# Patient Record
Sex: FEMALE | Race: WHITE | NOT HISPANIC OR LATINO
[De-identification: names, ages, dates, MRNs, and addresses within clinical notes are randomized per-mention and may not be internally consistent; named-entity substitution may affect disease eponyms.]

---

## 2017-02-07 PROBLEM — Z00.00 ENCOUNTER FOR PREVENTIVE HEALTH EXAMINATION: Status: ACTIVE | Noted: 2017-02-07

## 2017-03-06 ENCOUNTER — APPOINTMENT (OUTPATIENT)
Dept: OBGYN | Facility: CLINIC | Age: 27
End: 2017-03-06

## 2017-03-06 VITALS
HEIGHT: 62 IN | WEIGHT: 108 LBS | BODY MASS INDEX: 19.88 KG/M2 | DIASTOLIC BLOOD PRESSURE: 60 MMHG | SYSTOLIC BLOOD PRESSURE: 100 MMHG

## 2017-03-06 DIAGNOSIS — M71.22 SYNOVIAL CYST OF POPLITEAL SPACE [BAKER], LEFT KNEE: ICD-10-CM

## 2017-03-06 DIAGNOSIS — D16.9 BENIGN NEOPLASM OF BONE AND ARTICULAR CARTILAGE, UNSPECIFIED: ICD-10-CM

## 2017-03-06 DIAGNOSIS — Z84.2 FAMILY HISTORY OF OTHER DISEASES OF THE GENITOURINARY SYSTEM: ICD-10-CM

## 2017-03-06 RX ORDER — SERTRALINE HYDROCHLORIDE 25 MG/1
TABLET, FILM COATED ORAL
Refills: 0 | Status: ACTIVE | COMMUNITY

## 2017-03-27 LAB
ESTRADIOL FREE SERPL-MCNC: 49 PG/ML
FSH SERPL-MCNC: 5.3 IU/L
PROGEST SERPL-MCNC: 0.3 NG/ML
PROLACTIN SERPL-MCNC: 8.6 NG/ML
TSH SERPL DL<=0.005 MIU/L-ACNC: 1.37 UIU/ML

## 2017-03-29 LAB
TESTOSTERONE SERUM FREE (NORTH): 0.9 PG/ML
TESTOSTERONE SERUM TOTAL (NORTH): 20 NG/DL

## 2017-03-30 LAB
17OHP SERPL-MCNC: 31 NG/DL
DHEA SERPL-MCNC: 238 NG/DL

## 2017-04-12 ENCOUNTER — RESULT REVIEW (OUTPATIENT)
Age: 27
End: 2017-04-12

## 2017-04-13 LAB
ESTRADIOL FREE SERPL-MCNC: 284 PG/ML
FSH SERPL-MCNC: 2 IU/L
PROGEST SERPL-MCNC: 24.1 NG/ML
PROLACTIN SERPL-MCNC: 9.3 NG/ML
TSH SERPL DL<=0.005 MIU/L-ACNC: 1.43 UIU/ML

## 2017-04-17 LAB
TESTOSTERONE SERUM FREE (NORTH): 1.6 PG/ML
TESTOSTERONE SERUM TOTAL (NORTH): 24 NG/DL

## 2017-04-19 LAB
17OHP SERPL-MCNC: 206 NG/DL
DHEA SERPL-MCNC: 207 NG/DL

## 2017-04-20 ENCOUNTER — OUTPATIENT (OUTPATIENT)
Dept: OUTPATIENT SERVICES | Facility: HOSPITAL | Age: 27
LOS: 1 days | Discharge: HOME | End: 2017-04-20

## 2017-04-20 ENCOUNTER — APPOINTMENT (OUTPATIENT)
Dept: OBGYN | Facility: CLINIC | Age: 27
End: 2017-04-20

## 2017-04-20 VITALS
SYSTOLIC BLOOD PRESSURE: 100 MMHG | DIASTOLIC BLOOD PRESSURE: 60 MMHG | HEIGHT: 62 IN | BODY MASS INDEX: 19.88 KG/M2 | WEIGHT: 108 LBS

## 2017-06-27 DIAGNOSIS — Z78.9 OTHER SPECIFIED HEALTH STATUS: ICD-10-CM

## 2017-07-14 ENCOUNTER — OUTPATIENT (OUTPATIENT)
Dept: OUTPATIENT SERVICES | Facility: HOSPITAL | Age: 27
LOS: 1 days | Discharge: HOME | End: 2017-07-14

## 2017-07-14 DIAGNOSIS — N97.9 FEMALE INFERTILITY, UNSPECIFIED: ICD-10-CM

## 2017-07-17 ENCOUNTER — TRANSCRIPTION ENCOUNTER (OUTPATIENT)
Age: 27
End: 2017-07-17

## 2018-10-09 ENCOUNTER — OUTPATIENT (OUTPATIENT)
Dept: OUTPATIENT SERVICES | Facility: HOSPITAL | Age: 28
LOS: 1 days | Discharge: HOME | End: 2018-10-09

## 2018-10-09 ENCOUNTER — LABORATORY RESULT (OUTPATIENT)
Age: 28
End: 2018-10-09

## 2018-10-09 ENCOUNTER — APPOINTMENT (OUTPATIENT)
Dept: OBGYN | Facility: CLINIC | Age: 28
End: 2018-10-09
Payer: COMMERCIAL

## 2018-10-09 VITALS — BODY MASS INDEX: 21.16 KG/M2 | WEIGHT: 115 LBS | HEIGHT: 62 IN

## 2018-10-09 DIAGNOSIS — O21.9 VOMITING OF PREGNANCY, UNSPECIFIED: ICD-10-CM

## 2018-10-09 LAB
BILIRUB UR QL STRIP: NORMAL
CLARITY UR: CLEAR
GLUCOSE UR-MCNC: NORMAL
HCG UR QL: NORMAL EU/DL
HGB UR QL STRIP.AUTO: 250
KETONES UR-MCNC: NORMAL
LEUKOCYTE ESTERASE UR QL STRIP: 500
NITRITE UR QL STRIP: NORMAL
PH UR STRIP: 5
PROT UR STRIP-MCNC: NORMAL
SP GR UR STRIP: 1.02

## 2018-10-09 PROCEDURE — 99213 OFFICE O/P EST LOW 20 MIN: CPT | Mod: 25

## 2018-10-09 PROCEDURE — 76830 TRANSVAGINAL US NON-OB: CPT

## 2018-10-11 DIAGNOSIS — N91.2 AMENORRHEA, UNSPECIFIED: ICD-10-CM

## 2018-10-16 ENCOUNTER — LABORATORY RESULT (OUTPATIENT)
Age: 28
End: 2018-10-16

## 2018-10-16 ENCOUNTER — OUTPATIENT (OUTPATIENT)
Dept: OUTPATIENT SERVICES | Facility: HOSPITAL | Age: 28
LOS: 1 days | Discharge: HOME | End: 2018-10-16

## 2018-10-16 DIAGNOSIS — Z34.00 ENCOUNTER FOR SUPERVISION OF NORMAL FIRST PREGNANCY, UNSPECIFIED TRIMESTER: ICD-10-CM

## 2018-10-30 ENCOUNTER — APPOINTMENT (OUTPATIENT)
Dept: OBGYN | Facility: CLINIC | Age: 28
End: 2018-10-30
Payer: COMMERCIAL

## 2018-10-30 ENCOUNTER — NON-APPOINTMENT (OUTPATIENT)
Age: 28
End: 2018-10-30

## 2018-10-30 VITALS — BODY MASS INDEX: 22.08 KG/M2 | HEIGHT: 62 IN | WEIGHT: 120 LBS

## 2018-10-30 PROCEDURE — 0502F SUBSEQUENT PRENATAL CARE: CPT

## 2018-11-15 ENCOUNTER — NON-APPOINTMENT (OUTPATIENT)
Age: 28
End: 2018-11-15

## 2018-11-21 ENCOUNTER — APPOINTMENT (OUTPATIENT)
Dept: OBGYN | Facility: CLINIC | Age: 28
End: 2018-11-21
Payer: COMMERCIAL

## 2018-11-21 ENCOUNTER — NON-APPOINTMENT (OUTPATIENT)
Age: 28
End: 2018-11-21

## 2018-11-21 VITALS — DIASTOLIC BLOOD PRESSURE: 60 MMHG | BODY MASS INDEX: 22.68 KG/M2 | WEIGHT: 124 LBS | SYSTOLIC BLOOD PRESSURE: 100 MMHG

## 2018-11-21 LAB
GLUCOSE UR-MCNC: NORMAL
HGB UR QL STRIP.AUTO: NORMAL
KETONES UR-MCNC: NORMAL
LEUKOCYTE ESTERASE UR QL STRIP: NORMAL
NITRITE UR QL STRIP: NORMAL
PROT UR STRIP-MCNC: NORMAL

## 2018-11-21 PROCEDURE — 0502F SUBSEQUENT PRENATAL CARE: CPT

## 2018-11-21 PROCEDURE — 90682 RIV4 VACC RECOMBINANT DNA IM: CPT

## 2018-11-21 PROCEDURE — 90471 IMMUNIZATION ADMIN: CPT

## 2018-12-10 ENCOUNTER — NON-APPOINTMENT (OUTPATIENT)
Age: 28
End: 2018-12-10

## 2018-12-11 ENCOUNTER — APPOINTMENT (OUTPATIENT)
Dept: OBGYN | Facility: CLINIC | Age: 28
End: 2018-12-11
Payer: COMMERCIAL

## 2018-12-11 ENCOUNTER — NON-APPOINTMENT (OUTPATIENT)
Age: 28
End: 2018-12-11

## 2018-12-11 VITALS — BODY MASS INDEX: 23.37 KG/M2 | HEIGHT: 62 IN | WEIGHT: 127 LBS

## 2018-12-11 PROCEDURE — 0501F PRENATAL FLOW SHEET: CPT

## 2018-12-12 ENCOUNTER — TRANSCRIPTION ENCOUNTER (OUTPATIENT)
Age: 28
End: 2018-12-12

## 2018-12-12 LAB
BILIRUB UR QL STRIP: NORMAL
CLARITY UR: CLEAR
GLUCOSE UR-MCNC: NORMAL
HCG UR QL: NORMAL EU/DL
HGB UR QL STRIP.AUTO: NORMAL
KETONES UR-MCNC: NORMAL
LEUKOCYTE ESTERASE UR QL STRIP: 500
NITRITE UR QL STRIP: NORMAL
PH UR STRIP: 5
PROT UR STRIP-MCNC: NORMAL
SP GR UR STRIP: 1.02

## 2018-12-27 ENCOUNTER — APPOINTMENT (OUTPATIENT)
Dept: OBGYN | Facility: CLINIC | Age: 28
End: 2018-12-27
Payer: COMMERCIAL

## 2018-12-27 PROCEDURE — 76805 OB US >/= 14 WKS SNGL FETUS: CPT

## 2018-12-28 ENCOUNTER — NON-APPOINTMENT (OUTPATIENT)
Age: 28
End: 2018-12-28

## 2019-01-02 ENCOUNTER — NON-APPOINTMENT (OUTPATIENT)
Age: 29
End: 2019-01-02

## 2019-01-02 ENCOUNTER — APPOINTMENT (OUTPATIENT)
Dept: OBGYN | Facility: CLINIC | Age: 29
End: 2019-01-02
Payer: COMMERCIAL

## 2019-01-02 ENCOUNTER — CLINICAL ADVICE (OUTPATIENT)
Age: 29
End: 2019-01-02

## 2019-01-02 VITALS — BODY MASS INDEX: 23.96 KG/M2 | DIASTOLIC BLOOD PRESSURE: 70 MMHG | SYSTOLIC BLOOD PRESSURE: 100 MMHG | WEIGHT: 131 LBS

## 2019-01-02 PROCEDURE — 0502F SUBSEQUENT PRENATAL CARE: CPT

## 2019-01-22 ENCOUNTER — APPOINTMENT (OUTPATIENT)
Dept: OBGYN | Facility: CLINIC | Age: 29
End: 2019-01-22
Payer: COMMERCIAL

## 2019-01-22 ENCOUNTER — NON-APPOINTMENT (OUTPATIENT)
Age: 29
End: 2019-01-22

## 2019-01-22 VITALS
HEIGHT: 62 IN | DIASTOLIC BLOOD PRESSURE: 60 MMHG | SYSTOLIC BLOOD PRESSURE: 100 MMHG | WEIGHT: 135 LBS | BODY MASS INDEX: 24.84 KG/M2

## 2019-01-22 LAB
BILIRUB UR QL STRIP: NORMAL
CLARITY UR: CLEAR
GLUCOSE UR-MCNC: NORMAL
HCG UR QL: NORMAL EU/DL
HGB UR QL STRIP.AUTO: NORMAL
KETONES UR-MCNC: NORMAL
LEUKOCYTE ESTERASE UR QL STRIP: 500
NITRITE UR QL STRIP: NORMAL
PH UR STRIP: 6
PROT UR STRIP-MCNC: NORMAL
SP GR UR STRIP: 1.01

## 2019-01-22 PROCEDURE — 0502F SUBSEQUENT PRENATAL CARE: CPT

## 2019-01-26 ENCOUNTER — OUTPATIENT (OUTPATIENT)
Dept: OUTPATIENT SERVICES | Facility: HOSPITAL | Age: 29
LOS: 1 days | Discharge: HOME | End: 2019-01-26

## 2019-01-26 ENCOUNTER — LABORATORY RESULT (OUTPATIENT)
Age: 29
End: 2019-01-26

## 2019-01-26 DIAGNOSIS — Z34.90 ENCOUNTER FOR SUPERVISION OF NORMAL PREGNANCY, UNSPECIFIED, UNSPECIFIED TRIMESTER: ICD-10-CM

## 2019-01-28 LAB
BASOPHILS # BLD AUTO: 0.02 K/UL
BASOPHILS NFR BLD AUTO: 0.3 %
EOSINOPHIL # BLD AUTO: 0.07 K/UL
EOSINOPHIL NFR BLD AUTO: 0.9 %
HCT VFR BLD CALC: 34.8 %
HGB BLD-MCNC: 11 G/DL
IMM GRANULOCYTES NFR BLD AUTO: 0.5 %
LYMPHOCYTES # BLD AUTO: 1.08 K/UL
LYMPHOCYTES NFR BLD AUTO: 13.5 %
MAN DIFF?: NORMAL
MCHC RBC-ENTMCNC: 28.5 PG
MCHC RBC-ENTMCNC: 31.6 G/DL
MCV RBC AUTO: 90.2 FL
MONOCYTES # BLD AUTO: 0.42 K/UL
MONOCYTES NFR BLD AUTO: 5.3 %
NEUTROPHILS # BLD AUTO: 6.36 K/UL
NEUTROPHILS NFR BLD AUTO: 79.5 %
PLATELET # BLD AUTO: 228 K/UL
RBC # BLD: 3.86 M/UL
RBC # FLD: 13 %
WBC # FLD AUTO: 7.99 K/UL

## 2019-01-31 ENCOUNTER — NON-APPOINTMENT (OUTPATIENT)
Age: 29
End: 2019-01-31

## 2019-01-31 ENCOUNTER — OUTPATIENT (OUTPATIENT)
Dept: OUTPATIENT SERVICES | Facility: HOSPITAL | Age: 29
LOS: 1 days | Discharge: HOME | End: 2019-01-31

## 2019-01-31 ENCOUNTER — APPOINTMENT (OUTPATIENT)
Dept: OBGYN | Facility: CLINIC | Age: 29
End: 2019-01-31
Payer: COMMERCIAL

## 2019-01-31 VITALS — WEIGHT: 137 LBS | BODY MASS INDEX: 25.06 KG/M2 | DIASTOLIC BLOOD PRESSURE: 60 MMHG | SYSTOLIC BLOOD PRESSURE: 100 MMHG

## 2019-01-31 DIAGNOSIS — N89.8 OTHER SPECIFIED NONINFLAMMATORY DISORDERS OF VAGINA: ICD-10-CM

## 2019-01-31 PROCEDURE — 87075 CULTR BACTERIA EXCEPT BLOOD: CPT

## 2019-01-31 PROCEDURE — 0502F SUBSEQUENT PRENATAL CARE: CPT

## 2019-02-11 ENCOUNTER — RESULT REVIEW (OUTPATIENT)
Age: 29
End: 2019-02-11

## 2019-02-11 LAB
A VAGINAE DNA VAG QL NAA+PROBE: ABNORMAL
BVAB2 DNA VAG QL NAA+PROBE: NORMAL
C KRUSEI DNA VAG QL NAA+PROBE: NEGATIVE
C KRUSEI DNA VAG QL NAA+PROBE: POSITIVE
C TRACH RRNA SPEC QL NAA+PROBE: NEGATIVE
MEGA1 DNA VAG QL NAA+PROBE: NORMAL
N GONORRHOEA RRNA SPEC QL NAA+PROBE: NEGATIVE
T VAGINALIS RRNA SPEC QL NAA+PROBE: NEGATIVE

## 2019-02-12 ENCOUNTER — NON-APPOINTMENT (OUTPATIENT)
Age: 29
End: 2019-02-12

## 2019-02-12 ENCOUNTER — APPOINTMENT (OUTPATIENT)
Dept: OBGYN | Facility: CLINIC | Age: 29
End: 2019-02-12
Payer: COMMERCIAL

## 2019-02-12 VITALS — BODY MASS INDEX: 25.76 KG/M2 | WEIGHT: 140 LBS | HEIGHT: 62 IN

## 2019-02-12 LAB
BILIRUB UR QL STRIP: NORMAL
CLARITY UR: CLEAR
GLUCOSE UR-MCNC: NORMAL
HCG UR QL: NORMAL EU/DL
HGB UR QL STRIP.AUTO: NORMAL
KETONES UR-MCNC: NORMAL
LEUKOCYTE ESTERASE UR QL STRIP: 250
NITRITE UR QL STRIP: NORMAL
PH UR STRIP: 8
PROT UR STRIP-MCNC: NORMAL
SP GR UR STRIP: 1.01

## 2019-02-12 PROCEDURE — 0502F SUBSEQUENT PRENATAL CARE: CPT

## 2019-02-13 ENCOUNTER — CLINICAL ADVICE (OUTPATIENT)
Age: 29
End: 2019-02-13

## 2019-02-27 ENCOUNTER — OUTPATIENT (OUTPATIENT)
Dept: OUTPATIENT SERVICES | Facility: HOSPITAL | Age: 29
LOS: 1 days | Discharge: HOME | End: 2019-02-27

## 2019-02-27 DIAGNOSIS — Z34.90 ENCOUNTER FOR SUPERVISION OF NORMAL PREGNANCY, UNSPECIFIED, UNSPECIFIED TRIMESTER: ICD-10-CM

## 2019-03-02 ENCOUNTER — OUTPATIENT (OUTPATIENT)
Dept: OUTPATIENT SERVICES | Facility: HOSPITAL | Age: 29
LOS: 1 days | Discharge: HOME | End: 2019-03-02

## 2019-03-02 VITALS — SYSTOLIC BLOOD PRESSURE: 112 MMHG | HEART RATE: 85 BPM | DIASTOLIC BLOOD PRESSURE: 66 MMHG

## 2019-03-02 VITALS
DIASTOLIC BLOOD PRESSURE: 65 MMHG | HEART RATE: 84 BPM | RESPIRATION RATE: 17 BRPM | SYSTOLIC BLOOD PRESSURE: 101 MMHG | TEMPERATURE: 99 F

## 2019-03-02 DIAGNOSIS — Z98.890 OTHER SPECIFIED POSTPROCEDURAL STATES: Chronic | ICD-10-CM

## 2019-03-02 LAB
APTT BLD: 25.7 SEC — LOW (ref 27–39.2)
APTT BLD: 26.8 SEC — LOW (ref 27–39.2)
BLD GP AB SCN SERPL QL: SIGNIFICANT CHANGE UP
FIBRINOGEN PPP-MCNC: 524 MG/DL — SIGNIFICANT CHANGE UP (ref 204.4–570.6)
HCT VFR BLD CALC: 30.4 % — LOW (ref 37–47)
HCT VFR BLD CALC: 31.5 % — LOW (ref 37–47)
HGB BLD-MCNC: 10.2 G/DL — LOW (ref 12–16)
HGB BLD-MCNC: 9.8 G/DL — LOW (ref 12–16)
INR BLD: 1.04 RATIO — SIGNIFICANT CHANGE UP (ref 0.65–1.3)
INR BLD: 1.04 RATIO — SIGNIFICANT CHANGE UP (ref 0.65–1.3)
MCHC RBC-ENTMCNC: 27.4 PG — SIGNIFICANT CHANGE UP (ref 27–31)
MCHC RBC-ENTMCNC: 27.6 PG — SIGNIFICANT CHANGE UP (ref 27–31)
MCHC RBC-ENTMCNC: 32.2 G/DL — SIGNIFICANT CHANGE UP (ref 32–37)
MCHC RBC-ENTMCNC: 32.4 G/DL — SIGNIFICANT CHANGE UP (ref 32–37)
MCV RBC AUTO: 84.9 FL — SIGNIFICANT CHANGE UP (ref 81–99)
MCV RBC AUTO: 85.1 FL — SIGNIFICANT CHANGE UP (ref 81–99)
NRBC # BLD: 0 /100 WBCS — SIGNIFICANT CHANGE UP (ref 0–0)
NRBC # BLD: 0 /100 WBCS — SIGNIFICANT CHANGE UP (ref 0–0)
PLATELET # BLD AUTO: 232 K/UL — SIGNIFICANT CHANGE UP (ref 130–400)
PLATELET # BLD AUTO: 248 K/UL — SIGNIFICANT CHANGE UP (ref 130–400)
PROTHROM AB SERPL-ACNC: 11.9 SEC — SIGNIFICANT CHANGE UP (ref 9.95–12.87)
PROTHROM AB SERPL-ACNC: 11.9 SEC — SIGNIFICANT CHANGE UP (ref 9.95–12.87)
RBC # BLD: 3.58 M/UL — LOW (ref 4.2–5.4)
RBC # BLD: 3.7 M/UL — LOW (ref 4.2–5.4)
RBC # FLD: 12.9 % — SIGNIFICANT CHANGE UP (ref 11.5–14.5)
RBC # FLD: 13 % — SIGNIFICANT CHANGE UP (ref 11.5–14.5)
TYPE + AB SCN PNL BLD: SIGNIFICANT CHANGE UP
WBC # BLD: 10.62 K/UL — SIGNIFICANT CHANGE UP (ref 4.8–10.8)
WBC # BLD: 9.57 K/UL — SIGNIFICANT CHANGE UP (ref 4.8–10.8)
WBC # FLD AUTO: 10.62 K/UL — SIGNIFICANT CHANGE UP (ref 4.8–10.8)
WBC # FLD AUTO: 9.57 K/UL — SIGNIFICANT CHANGE UP (ref 4.8–10.8)

## 2019-03-02 RX ORDER — SODIUM CHLORIDE 9 MG/ML
1000 INJECTION, SOLUTION INTRAVENOUS
Qty: 0 | Refills: 0 | Status: DISCONTINUED | OUTPATIENT
Start: 2019-03-02 | End: 2019-03-17

## 2019-03-02 NOTE — PROGRESS NOTE ADULT - ASSESSMENT
29 yo  at 29w5d GA dated by day 5 FET with ICSI, h/o anxiety on zoloft, with resolved vaginal bleeding, intermittently amy q2m on toco, for r/o abruption and r/o PTL  -repeat abruption labs now  -c/w IV fluids  -continuous efm and toco  -pain management if needed      DIscussed with Dr. Bowen and Dr. Cates

## 2019-03-02 NOTE — OB PROVIDER TRIAGE NOTE - NSHPPHYSICALEXAM_GEN_ALL_CORE
Vital Signs Last 24 Hrs  T(F): 98.6 (02 Mar 2019 10:57), Max: 98.6 (02 Mar 2019 10:57)  HR: 85 (02 Mar 2019 10:57) (85 - 85)  BP: 112/66 (02 Mar 2019 10:57) (112/66 - 112/66)  RR: 16 (02 Mar 2019 10:57) (16 - 16)    Udip: large leuks, large blood    EFM: cateogry 1  [place]: Q2m    Gen: AAOx3 NAD  Abd: soft, nontender, gravid, no palpable ctx, no cva tenderness, no suprapubic tenderness  Speculum: moderate amount of brown discharge (likely old blood), several drops of bright red blood on cervix, no active bleeding, no visible lesions.

## 2019-03-02 NOTE — OB PROVIDER TRIAGE NOTE - NSHPLABSRESULTS_GEN_ALL_CORE
Sonos: Sonos:  20w5d anatomy scan normal  13w2d SIUP  9w1d SIUP    Labs:  B positive, antibody negative  HIV NR  Hep B sAg NR  Rubella immune  Varicella immune  GCT 96  Pap NILM, HPV HR neg

## 2019-03-02 NOTE — OB PROVIDER TRIAGE NOTE - ADDITIONAL INSTRUCTIONS
Followup with Dr. Cates as scheduled in 3 days.  If you have bleeding, cramping, or leakage of fluid, let your doctor know or come back to labor and delivery.  Please perform nightly kick counts.

## 2019-03-02 NOTE — PROGRESS NOTE ADULT - SUBJECTIVE AND OBJECTIVE BOX
PGY2 Note:    Pt evaluated at bedside, she denies any complaints. She denies cramping, leaking fluid, or vaginal bleeding. She continues to feel regular fetal movement. She tolerated PO without difficulty.    Vital Signs Last 24 Hrs  T(F): 98.06 (02 Mar 2019 19:40), Max: 98.6 (02 Mar 2019 10:57)  HR: 76 (02 Mar 2019 19:40) (74 - 85)  BP: 108/56 (02 Mar 2019 19:40) (105/55 - 112/66)  RR: 18 (02 Mar 2019 19:40) (16 - 18)    EFM: 130/mod/accels  [place]: intermittent contractions, nonpalpable  SVE: Deferred, SVE closed x2 as previously documented    Labs:                        9.8    10.62 )-----------( 248      ( 02 Mar 2019 18:25 )             30.4                         10.2   9.57  )-----------( 232      ( 02 Mar 2019 13:10 )             31.5     Fibrinogen Assay (03.02.19 @ 18:25)    Fibrinogen Assay: 524.0 mg/dL    Activated Partial Thromboplastin Time in AM (03.02.19 @ 18:25)    Activated Partial Thromboplastin Time: 25.7: Heparin Therapeutic Range: 76..97 Sec sec    Prothrombin Time and INR, Plasma in AM (03.02.19 @ 18:25)    Prothrombin Time, Plasma: 11.90 sec    INR: 1.04: NO ANTICOAGULANT,NORMAL            0.65 -  1.30  ORAL ANTICOAGULANT,STD DOSE        2.00 - 3.00  MECHANICAL HEART VALVES            2.50 - 3.50  NOTE: INR RESULTS ARE INTENDED FOR USE ONLY TO  MONITOR  ORAL ANTICOAGULANT THERAPY IN STABILIZED  PATIENTS. ratio

## 2019-03-02 NOTE — OB PROVIDER TRIAGE NOTE - NSOBPROVIDERNOTE_OBGYN_ALL_OB_FT
27 yo  at 29w5d GA dated by day 5 FET with ICSI, with vaginal bleeding and contractions on toco, for r/o abruption and r/o PTL  -tranfer to recovery room  -IV fluids  -abruption labs  -continuous EFM and toco    Discussed with Dr. Bowen and Dr. Cates 27 yo  at 29w5d GA dated by day 5 FET with ICSI, h/o anxiety on zoloft, with vaginal bleeding and contractions on toco, for r/o abruption and r/o PTL  -tranfer to recovery room  -IV fluids  -abruption labs  -continuous EFM and toco    Discussed with Dr. Bowen and Dr. Cates

## 2019-03-02 NOTE — PROGRESS NOTE ADULT - SUBJECTIVE AND OBJECTIVE BOX
PGY2 Note:    Pt evaluated at bedside, she has been amy q2m intermittently but does not feel and pain. She denies leakage of fluid or any new bleeding. She still feels regular fetal movement.    Vital Signs Last 24 Hrs  T(F): 98.6 (02 Mar 2019 10:57), Max: 98.6 (02 Mar 2019 10:57)  HR: 74 (02 Mar 2019 13:57) (74 - 85)  BP: 105/55 (02 Mar 2019 13:57) (105/55 - 112/66)  RR: 16 (02 Mar 2019 10:57) (16 - 16)    EFM: 150/mod/accels, occasional variable deceleration  [place]: q2m  SVE: re-examined, closed cervix at 1616    Labs:                        10.2   9.57  )-----------( 232      ( 02 Mar 2019 13:10 )             31.5     PT/INR - ( 02 Mar 2019 13:10 )   PT: 11.90 sec;   INR: 1.04 ratio         PTT - ( 02 Mar 2019 13:10 )  PTT:26.8 sec    Meds:  IV fluids

## 2019-03-02 NOTE — OB PROVIDER TRIAGE NOTE - HISTORY OF PRESENT ILLNESS
27 yo  at 29w5d GA by day 5 FET with ICSI, first trimester sono presents for vaginal bleeding for 3 days. She had some spotting 3 days ago, followed by brown discharge for 2 days, today she had light vaginal bleeding when she woke up, staining 1 pad today with bright red blood. She denies cramping or leaking fluid. She feels regular fetal movement. She had a subchorionic hematoma in first trimester, with frequent spotting that eventually resolved.  She denies dizziness, weakness, fevers, chills, shortness of breath, chest pain, nausea, vomiting, dysuria, hematuria, diarrhea, or constipation. Last intercourse several months ago. Last bowel movement yesterday.

## 2019-03-04 LAB
B19V IGG SER QL IA: 0.1 INDEX
B19V IGG+IGM SER-IMP: NEGATIVE
B19V IGG+IGM SER-IMP: NORMAL
B19V IGM FLD-ACNC: 0.2
B19V IGM SER-ACNC: NEGATIVE

## 2019-03-05 ENCOUNTER — NON-APPOINTMENT (OUTPATIENT)
Age: 29
End: 2019-03-05

## 2019-03-05 ENCOUNTER — APPOINTMENT (OUTPATIENT)
Dept: OBGYN | Facility: CLINIC | Age: 29
End: 2019-03-05
Payer: COMMERCIAL

## 2019-03-05 VITALS — DIASTOLIC BLOOD PRESSURE: 60 MMHG | WEIGHT: 148 LBS | SYSTOLIC BLOOD PRESSURE: 100 MMHG | BODY MASS INDEX: 27.07 KG/M2

## 2019-03-05 PROBLEM — F41.9 ANXIETY DISORDER, UNSPECIFIED: Chronic | Status: ACTIVE | Noted: 2019-03-02

## 2019-03-05 PROCEDURE — 0502F SUBSEQUENT PRENATAL CARE: CPT

## 2019-03-19 ENCOUNTER — APPOINTMENT (OUTPATIENT)
Dept: OBGYN | Facility: CLINIC | Age: 29
End: 2019-03-19
Payer: COMMERCIAL

## 2019-03-19 ENCOUNTER — NON-APPOINTMENT (OUTPATIENT)
Age: 29
End: 2019-03-19

## 2019-03-19 VITALS — BODY MASS INDEX: 27.07 KG/M2 | DIASTOLIC BLOOD PRESSURE: 60 MMHG | SYSTOLIC BLOOD PRESSURE: 100 MMHG | WEIGHT: 148 LBS

## 2019-03-19 PROCEDURE — 0502F SUBSEQUENT PRENATAL CARE: CPT

## 2019-03-23 ENCOUNTER — APPOINTMENT (OUTPATIENT)
Dept: OBGYN | Facility: CLINIC | Age: 29
End: 2019-03-23
Payer: COMMERCIAL

## 2019-03-23 PROCEDURE — 76819 FETAL BIOPHYS PROFIL W/O NST: CPT

## 2019-03-23 PROCEDURE — 76705 ECHO EXAM OF ABDOMEN: CPT

## 2019-04-02 ENCOUNTER — APPOINTMENT (OUTPATIENT)
Dept: OBGYN | Facility: CLINIC | Age: 29
End: 2019-04-02
Payer: COMMERCIAL

## 2019-04-02 ENCOUNTER — NON-APPOINTMENT (OUTPATIENT)
Age: 29
End: 2019-04-02

## 2019-04-02 VITALS — BODY MASS INDEX: 27.62 KG/M2 | DIASTOLIC BLOOD PRESSURE: 70 MMHG | WEIGHT: 151 LBS | SYSTOLIC BLOOD PRESSURE: 120 MMHG

## 2019-04-02 PROCEDURE — 0502F SUBSEQUENT PRENATAL CARE: CPT

## 2019-04-08 ENCOUNTER — OTHER (OUTPATIENT)
Age: 29
End: 2019-04-08

## 2019-04-16 ENCOUNTER — OUTPATIENT (OUTPATIENT)
Dept: OUTPATIENT SERVICES | Facility: HOSPITAL | Age: 29
LOS: 1 days | Discharge: HOME | End: 2019-04-16

## 2019-04-16 ENCOUNTER — NON-APPOINTMENT (OUTPATIENT)
Age: 29
End: 2019-04-16

## 2019-04-16 ENCOUNTER — APPOINTMENT (OUTPATIENT)
Dept: OBGYN | Facility: CLINIC | Age: 29
End: 2019-04-16
Payer: COMMERCIAL

## 2019-04-16 VITALS — SYSTOLIC BLOOD PRESSURE: 110 MMHG | BODY MASS INDEX: 28.53 KG/M2 | WEIGHT: 156 LBS | DIASTOLIC BLOOD PRESSURE: 70 MMHG

## 2019-04-16 DIAGNOSIS — Z98.890 OTHER SPECIFIED POSTPROCEDURAL STATES: Chronic | ICD-10-CM

## 2019-04-16 DIAGNOSIS — Z34.90 ENCOUNTER FOR SUPERVISION OF NORMAL PREGNANCY, UNSPECIFIED, UNSPECIFIED TRIMESTER: ICD-10-CM

## 2019-04-16 DIAGNOSIS — N89.8 OTHER SPECIFIED NONINFLAMMATORY DISORDERS OF VAGINA: ICD-10-CM

## 2019-04-16 PROCEDURE — 87075 CULTR BACTERIA EXCEPT BLOOD: CPT

## 2019-04-16 PROCEDURE — 0502F SUBSEQUENT PRENATAL CARE: CPT

## 2019-04-17 LAB
CANDIDA VAG CYTO: DETECTED
G VAGINALIS+PREV SP MTYP VAG QL MICRO: NOT DETECTED
T VAGINALIS VAG QL WET PREP: NOT DETECTED

## 2019-04-21 LAB
GP B STREP DNA SPEC QL NAA+PROBE: NORMAL
GP B STREP DNA SPEC QL NAA+PROBE: NOT DETECTED
SOURCE GBS: NORMAL

## 2019-04-23 ENCOUNTER — NON-APPOINTMENT (OUTPATIENT)
Age: 29
End: 2019-04-23

## 2019-04-23 ENCOUNTER — APPOINTMENT (OUTPATIENT)
Dept: OBGYN | Facility: CLINIC | Age: 29
End: 2019-04-23
Payer: COMMERCIAL

## 2019-04-23 VITALS — BODY MASS INDEX: 28.34 KG/M2 | WEIGHT: 154 LBS | HEIGHT: 62 IN

## 2019-04-23 PROCEDURE — 0502F SUBSEQUENT PRENATAL CARE: CPT

## 2019-04-30 ENCOUNTER — NON-APPOINTMENT (OUTPATIENT)
Age: 29
End: 2019-04-30

## 2019-04-30 ENCOUNTER — APPOINTMENT (OUTPATIENT)
Dept: OBGYN | Facility: CLINIC | Age: 29
End: 2019-04-30
Payer: COMMERCIAL

## 2019-04-30 VITALS — SYSTOLIC BLOOD PRESSURE: 110 MMHG | WEIGHT: 157 LBS | DIASTOLIC BLOOD PRESSURE: 70 MMHG | BODY MASS INDEX: 28.72 KG/M2

## 2019-04-30 PROCEDURE — 0502F SUBSEQUENT PRENATAL CARE: CPT

## 2019-05-04 ENCOUNTER — INPATIENT (INPATIENT)
Facility: HOSPITAL | Age: 29
LOS: 3 days | Discharge: HOME | End: 2019-05-08
Attending: OBSTETRICS & GYNECOLOGY | Admitting: OBSTETRICS & GYNECOLOGY
Payer: COMMERCIAL

## 2019-05-04 VITALS — TEMPERATURE: 98 F | SYSTOLIC BLOOD PRESSURE: 123 MMHG | HEART RATE: 89 BPM | DIASTOLIC BLOOD PRESSURE: 80 MMHG

## 2019-05-04 DIAGNOSIS — Z98.890 OTHER SPECIFIED POSTPROCEDURAL STATES: Chronic | ICD-10-CM

## 2019-05-04 PROCEDURE — 59510 CESAREAN DELIVERY: CPT

## 2019-05-04 RX ORDER — SERTRALINE 25 MG/1
100 TABLET, FILM COATED ORAL DAILY
Qty: 0 | Refills: 0 | Status: DISCONTINUED | OUTPATIENT
Start: 2019-05-05 | End: 2019-05-08

## 2019-05-04 RX ORDER — DINOPROSTONE 10 MG/241MG
10 INSERT VAGINAL ONCE
Qty: 0 | Refills: 0 | Status: COMPLETED | OUTPATIENT
Start: 2019-05-04 | End: 2019-05-04

## 2019-05-04 RX ORDER — SODIUM CHLORIDE 9 MG/ML
1000 INJECTION, SOLUTION INTRAVENOUS
Qty: 0 | Refills: 0 | Status: DISCONTINUED | OUTPATIENT
Start: 2019-05-04 | End: 2019-05-05

## 2019-05-04 RX ORDER — SERTRALINE 25 MG/1
100 TABLET, FILM COATED ORAL DAILY
Qty: 0 | Refills: 0 | Status: DISCONTINUED | OUTPATIENT
Start: 2019-05-04 | End: 2019-05-04

## 2019-05-04 RX ORDER — OXYTOCIN 10 UNIT/ML
333.33 VIAL (ML) INJECTION
Qty: 20 | Refills: 0 | Status: DISCONTINUED | OUTPATIENT
Start: 2019-05-04 | End: 2019-05-08

## 2019-05-04 RX ADMIN — DINOPROSTONE 10 MILLIGRAM(S): 10 INSERT VAGINAL at 23:45

## 2019-05-04 NOTE — OB PROVIDER H&P - NSHPLABSRESULTS_GEN_ALL_CORE
GCT 96    12/8/2018 Normal fetal Echo  12/27/18 Sono at 20w5d, normal anatomy, fundal placenta  3/23/19: at 32w5d: EFW 4lb6oz, posterior placenta, MVP 7cm

## 2019-05-04 NOTE — OB PROVIDER H&P - NSHPPHYSICALEXAM_GEN_ALL_CORE
Vital Signs Last 24 Hrs  T(C): 36.9 (04 May 2019 22:54), Max: 36.9 (04 May 2019 22:46)  T(F): 98.5 (04 May 2019 22:54), Max: 98.5 (04 May 2019 22:46)  HR: 89 (04 May 2019 22:54) (89 - 89)  BP: 128/80 (04 May 2019 22:54) (123/80 - 128/80)  RR: 18 (04 May 2019 22:54) (18 - 18)    FHR 130bpm/ mod nafisa/ accels+  TOCO occasional  SVE: 1/50/-3, ruptured, light meconium, vertex   Speculum: Grossly ruptured     UDIP trace leuks

## 2019-05-04 NOTE — OB PROVIDER H&P - HISTORY OF PRESENT ILLNESS
29yo  at 38w5d, dated by LMP with 1st trimester sonogram, NILSA 2019, with SROM clear at 2130. She reports leakage of fluid, without consistent contractions. Denies vaginal bleeding, and reports good FM. Reports taking sertraline daily for anxiety, s/p normal fetal heart echo. Denies complications this pregnancy. ICSI pregnancy.

## 2019-05-04 NOTE — OB PROVIDER H&P - ASSESSMENT
27yo  at 38w5d, dated by LMP with 1st trimester sonogram, NILSA 2019, on Zoloft for anxiety, ICSI pregnancy, IOL for PROM at 2130.   -Admit to L+D  -Monitor EFM and TOCO   -IVF and labs, f/u HIV  -Epidural   -Clear liquid diet as tolerated  -Cervadil   -Zoloft ordered qday     Dr. Joshi aware, Dr. Kessler aware

## 2019-05-04 NOTE — OB PROVIDER H&P - NS_OBGYNHISTORY_OBGYN_ALL_OB_FT
OB: SABX2, no D+C. Denies ETOP.  Gyn: Denies history of abnormal papsmears, STIs, uterine fibroids or ovarian cysts.

## 2019-05-04 NOTE — OB PROVIDER H&P - NSPREVIOUSLIVEBIR_OBGYN_ALL_OB
Dr. Jones at bedside, notified pt took ASA 2 days ago. Asked regarding abx and SCDs, orders received.   No

## 2019-05-04 NOTE — OB RN PATIENT PROFILE - AS SC BRADEN NUTRITION
Refill done according to [facility] policy  Patient last seen 2/14/19     (3) adequate

## 2019-05-05 LAB
APPEARANCE UR: ABNORMAL
BACTERIA # UR AUTO: ABNORMAL /HPF
BASOPHILS # BLD AUTO: 0.02 K/UL — SIGNIFICANT CHANGE UP (ref 0–0.2)
BASOPHILS NFR BLD AUTO: 0.2 % — SIGNIFICANT CHANGE UP (ref 0–1)
BILIRUB UR-MCNC: NEGATIVE — SIGNIFICANT CHANGE UP
BLD GP AB SCN SERPL QL: SIGNIFICANT CHANGE UP
COLOR SPEC: YELLOW — SIGNIFICANT CHANGE UP
DIFF PNL FLD: ABNORMAL
EOSINOPHIL # BLD AUTO: 0.06 K/UL — SIGNIFICANT CHANGE UP (ref 0–0.7)
EOSINOPHIL NFR BLD AUTO: 0.7 % — SIGNIFICANT CHANGE UP (ref 0–8)
EPI CELLS # UR: ABNORMAL /HPF
GLUCOSE UR QL: NEGATIVE MG/DL — SIGNIFICANT CHANGE UP
HCT VFR BLD CALC: 30.4 % — LOW (ref 37–47)
HGB BLD-MCNC: 9.1 G/DL — LOW (ref 12–16)
HIV 1 & 2 AB SERPL IA.RAPID: SIGNIFICANT CHANGE UP
IMM GRANULOCYTES NFR BLD AUTO: 0.3 % — SIGNIFICANT CHANGE UP (ref 0.1–0.3)
KETONES UR-MCNC: NEGATIVE — SIGNIFICANT CHANGE UP
LEUKOCYTE ESTERASE UR-ACNC: ABNORMAL
LYMPHOCYTES # BLD AUTO: 1.65 K/UL — SIGNIFICANT CHANGE UP (ref 1.2–3.4)
LYMPHOCYTES # BLD AUTO: 18.2 % — LOW (ref 20.5–51.1)
MCHC RBC-ENTMCNC: 22.6 PG — LOW (ref 27–31)
MCHC RBC-ENTMCNC: 29.9 G/DL — LOW (ref 32–37)
MCV RBC AUTO: 75.6 FL — LOW (ref 81–99)
MONOCYTES # BLD AUTO: 0.55 K/UL — SIGNIFICANT CHANGE UP (ref 0.1–0.6)
MONOCYTES NFR BLD AUTO: 6.1 % — SIGNIFICANT CHANGE UP (ref 1.7–9.3)
NEUTROPHILS # BLD AUTO: 6.74 K/UL — HIGH (ref 1.4–6.5)
NEUTROPHILS NFR BLD AUTO: 74.5 % — SIGNIFICANT CHANGE UP (ref 42.2–75.2)
NITRITE UR-MCNC: NEGATIVE — SIGNIFICANT CHANGE UP
NRBC # BLD: 0 /100 WBCS — SIGNIFICANT CHANGE UP (ref 0–0)
PH UR: 6 — SIGNIFICANT CHANGE UP (ref 5–8)
PLATELET # BLD AUTO: 269 K/UL — SIGNIFICANT CHANGE UP (ref 130–400)
PRENATAL SYPHILIS TEST: SIGNIFICANT CHANGE UP
PROT UR-MCNC: NEGATIVE MG/DL — SIGNIFICANT CHANGE UP
RBC # BLD: 4.02 M/UL — LOW (ref 4.2–5.4)
RBC # FLD: 15.7 % — HIGH (ref 11.5–14.5)
RBC CASTS # UR COMP ASSIST: >50 /HPF
SP GR SPEC: 1.01 — SIGNIFICANT CHANGE UP (ref 1.01–1.03)
TYPE + AB SCN PNL BLD: SIGNIFICANT CHANGE UP
UROBILINOGEN FLD QL: 0.2 MG/DL — SIGNIFICANT CHANGE UP (ref 0.2–0.2)
WBC # BLD: 9.05 K/UL — SIGNIFICANT CHANGE UP (ref 4.8–10.8)
WBC # FLD AUTO: 9.05 K/UL — SIGNIFICANT CHANGE UP (ref 4.8–10.8)
WBC UR QL: >50 /HPF

## 2019-05-05 RX ORDER — SIMETHICONE 80 MG/1
80 TABLET, CHEWABLE ORAL EVERY 4 HOURS
Qty: 0 | Refills: 0 | Status: DISCONTINUED | OUTPATIENT
Start: 2019-05-05 | End: 2019-05-08

## 2019-05-05 RX ORDER — IBUPROFEN 200 MG
600 TABLET ORAL EVERY 6 HOURS
Qty: 0 | Refills: 0 | Status: COMPLETED | OUTPATIENT
Start: 2019-05-05 | End: 2020-04-02

## 2019-05-05 RX ORDER — MORPHINE SULFATE 50 MG/1
2 CAPSULE, EXTENDED RELEASE ORAL ONCE
Qty: 0 | Refills: 0 | Status: DISCONTINUED | OUTPATIENT
Start: 2019-05-05 | End: 2019-05-08

## 2019-05-05 RX ORDER — SODIUM CHLORIDE 9 MG/ML
1000 INJECTION, SOLUTION INTRAVENOUS
Qty: 0 | Refills: 0 | Status: DISCONTINUED | OUTPATIENT
Start: 2019-05-05 | End: 2019-05-06

## 2019-05-05 RX ORDER — GLYCERIN ADULT
1 SUPPOSITORY, RECTAL RECTAL AT BEDTIME
Qty: 0 | Refills: 0 | Status: DISCONTINUED | OUTPATIENT
Start: 2019-05-05 | End: 2019-05-08

## 2019-05-05 RX ORDER — DIPHENHYDRAMINE HCL 50 MG
25 CAPSULE ORAL ONCE
Qty: 0 | Refills: 0 | Status: COMPLETED | OUTPATIENT
Start: 2019-05-05 | End: 2019-05-05

## 2019-05-05 RX ORDER — LANOLIN
1 OINTMENT (GRAM) TOPICAL EVERY 6 HOURS
Qty: 0 | Refills: 0 | Status: DISCONTINUED | OUTPATIENT
Start: 2019-05-05 | End: 2019-05-08

## 2019-05-05 RX ORDER — OXYCODONE HYDROCHLORIDE 5 MG/1
5 TABLET ORAL
Qty: 0 | Refills: 0 | Status: DISCONTINUED | OUTPATIENT
Start: 2019-05-05 | End: 2019-05-08

## 2019-05-05 RX ORDER — BUTORPHANOL TARTRATE 2 MG/ML
1 INJECTION, SOLUTION INTRAMUSCULAR; INTRAVENOUS ONCE
Qty: 0 | Refills: 0 | Status: DISCONTINUED | OUTPATIENT
Start: 2019-05-05 | End: 2019-05-05

## 2019-05-05 RX ORDER — DOCUSATE SODIUM 100 MG
100 CAPSULE ORAL
Qty: 0 | Refills: 0 | Status: DISCONTINUED | OUTPATIENT
Start: 2019-05-05 | End: 2019-05-08

## 2019-05-05 RX ORDER — MAGNESIUM HYDROXIDE 400 MG/1
30 TABLET, CHEWABLE ORAL
Qty: 0 | Refills: 0 | Status: DISCONTINUED | OUTPATIENT
Start: 2019-05-05 | End: 2019-05-08

## 2019-05-05 RX ORDER — DIPHENHYDRAMINE HCL 50 MG
25 CAPSULE ORAL EVERY 6 HOURS
Qty: 0 | Refills: 0 | Status: DISCONTINUED | OUTPATIENT
Start: 2019-05-05 | End: 2019-05-08

## 2019-05-05 RX ORDER — ACETAMINOPHEN 500 MG
650 TABLET ORAL EVERY 6 HOURS
Qty: 0 | Refills: 0 | Status: DISCONTINUED | OUTPATIENT
Start: 2019-05-05 | End: 2019-05-08

## 2019-05-05 RX ORDER — CEFAZOLIN SODIUM 1 G
2000 VIAL (EA) INJECTION EVERY 8 HOURS
Qty: 0 | Refills: 0 | Status: COMPLETED | OUTPATIENT
Start: 2019-05-06 | End: 2019-05-06

## 2019-05-05 RX ORDER — ENOXAPARIN SODIUM 100 MG/ML
40 INJECTION SUBCUTANEOUS DAILY
Qty: 0 | Refills: 0 | Status: DISCONTINUED | OUTPATIENT
Start: 2019-05-06 | End: 2019-05-08

## 2019-05-05 RX ORDER — OXYTOCIN 10 UNIT/ML
2 VIAL (ML) INJECTION
Qty: 30 | Refills: 0 | Status: DISCONTINUED | OUTPATIENT
Start: 2019-05-05 | End: 2019-05-08

## 2019-05-05 RX ORDER — OXYTOCIN 10 UNIT/ML
41.67 VIAL (ML) INJECTION
Qty: 20 | Refills: 0 | Status: DISCONTINUED | OUTPATIENT
Start: 2019-05-05 | End: 2019-05-08

## 2019-05-05 RX ORDER — OXYTOCIN 10 UNIT/ML
41.67 VIAL (ML) INJECTION
Qty: 20 | Refills: 0 | Status: DISCONTINUED | OUTPATIENT
Start: 2019-05-05 | End: 2019-05-05

## 2019-05-05 RX ORDER — CEFAZOLIN SODIUM 1 G
2000 VIAL (EA) INJECTION ONCE
Qty: 0 | Refills: 0 | Status: COMPLETED | OUTPATIENT
Start: 2019-05-05 | End: 2019-05-05

## 2019-05-05 RX ADMIN — Medication 2 MILLIUNIT(S)/MIN: at 12:51

## 2019-05-05 RX ADMIN — Medication 100 MILLIGRAM(S): at 17:53

## 2019-05-05 RX ADMIN — SERTRALINE 100 MILLIGRAM(S): 25 TABLET, FILM COATED ORAL at 08:50

## 2019-05-05 RX ADMIN — BUTORPHANOL TARTRATE 1 MILLIGRAM(S): 2 INJECTION, SOLUTION INTRAMUSCULAR; INTRAVENOUS at 03:52

## 2019-05-05 RX ADMIN — Medication 25 MILLIGRAM(S): at 03:52

## 2019-05-05 RX ADMIN — BUTORPHANOL TARTRATE 1 MILLIGRAM(S): 2 INJECTION, SOLUTION INTRAMUSCULAR; INTRAVENOUS at 03:51

## 2019-05-05 NOTE — PROGRESS NOTE ADULT - SUBJECTIVE AND OBJECTIVE BOX
PGY 3 Note:    Patient seen at bedside for evaluation of labor progression, feeling pelvic pain, 5/10 intensity. Still leaking.     Vital Signs Last 24 Hrs  T(C): 37 (04 May 2019 23:58), Max: 37 (04 May 2019 23:58)  T(F): 98.6 (04 May 2019 23:58), Max: 98.6 (04 May 2019 23:58)  HR: 75 (05 May 2019 04:58) (70 - 89)  BP: 125/74 (05 May 2019 04:58) (110/59 - 129/77)  RR: 18 (04 May 2019 23:58) (18 - 18)  EFM: 120/mod/+accels  TOCO: Q2mins  SVE: 3/80/-3, vtx, ruptured light meconium      Medications:  butorphanol Injectable: 1 milliGRAM(s) (19 @ 03:51)  butorphanol Injectable: 1 milliGRAM(s) (19 @ 03:52)  dinoprostone Insert: 10 milliGRAM(s) (19 @ 23:45)  diphenhydrAMINE   Injectable: 25 milliGRAM(s) (19 @ 03:52)      Labs:                        9.1    9.05  )-----------( 269      ( 04 May 2019 23:38 )             30.4        Urinalysis Basic - ( 04 May 2019 23:39 )    Color: Yellow / Appearance: Cloudy / S.015 / pH: x  Gluc: x / Ketone: Negative  / Bili: Negative / Urobili: 0.2 mg/dL   Blood: x / Protein: Negative mg/dL / Nitrite: Negative   Leuk Esterase: Large / RBC: >50 /HPF / WBC >50 /HPF   Sq Epi: x / Non Sq Epi: Moderate /HPF / Bacteria: Moderate /HPF    Type + Screen: B POS (19 @ 23:38)    Antibody Screen: NEG (19 @ 23:38)    Rapid HIV-1/2 Antibody: Nonreact (19 @ 23:38)    Prenatal Syphilis Test: Nonreact (19 @ 23:38)    UDS pending

## 2019-05-05 NOTE — PROGRESS NOTE ADULT - ASSESSMENT
29yo  at 38w5d, GBS negative, IOL for PROM with light mec, s/p cervidil    - For epidural  - Cont current mgt  - Will discuss pitocin for continued IOL if ctx space out  - F/u UDS  - Cont toco and efm  - IV fluids  - clear liquid diet    Dr. Joshi aware

## 2019-05-05 NOTE — PROGRESS NOTE ADULT - SUBJECTIVE AND OBJECTIVE BOX
PGY 4 Preop Note    PMD at bedside with pt, prolonged second stage of labor, arrest of descent,. After 3 hours of pushing, fetal head and body have no progressed. No change in vaginal exam, caput present with vertex above. D/w pt r/b/a of  section. Consented    T(F): 97.7 (16:34)  HR: 97 (17:35)  BP: 119/58 (17:35)  RR: --    EFM: 125/mod/+acc  TOCO: q3 minutes  SVE: 10/100/+1, arrest of descent    Labs:                        9.1    9.05  )-----------( 269      ( 04 May 2019 23:38 )             30.4       Urinalysis Basic - ( 04 May 2019 23:39 )    Color: Yellow / Appearance: Cloudy / S.015 / pH: x  Gluc: x / Ketone: Negative  / Bili: Negative / Urobili: 0.2 mg/dL   Blood: x / Protein: Negative mg/dL / Nitrite: Negative   Leuk Esterase: Large / RBC: >50 /HPF / WBC >50 /HPF   Sq Epi: x / Non Sq Epi: Moderate /HPF / Bacteria: Moderate /HPF    Prenatal Syphilis Test: Nonreact (19 @ 23:38)    Meds:   (Floorstock) 1 each &lt;see task&gt; GivenOnce  (Floorstock) 1 each &lt;see task&gt; GivenOnce  (Floorstock) 1 each &lt;see task&gt; GivenOnce  (Floorstock) 1 each &lt;see task&gt; GivenOnce  butorphanol Injectable 1 milliGRAM(s) IntraMuscular once  butorphanol Injectable 1 milliGRAM(s) IV Push once  dinoprostone Insert 10 milliGRAM(s) Vaginal once  diphenhydrAMINE   Injectable 25 milliGRAM(s) IV Push once

## 2019-05-05 NOTE — CHART NOTE - NSCHARTNOTEFT_GEN_A_CORE
PACU ANESTHESIA ADMISSION NOTE      Procedure:  section    Post op diagnosis:  Arrest of descent, delivered, current hospitalization  Delivery of pregnancy by  section      ____  Intubated  TV:______       Rate: ______      FiO2: ______    _x___  Patent Airway    _x___  Full return of protective reflexes    _x___  Full recovery from anesthesia / back to baseline status    Vitals:  T(C): 36.5 (  HR: 83 (19 @ 19:48) (67 - 108)  BP: 110/67 (19 @ 19:44) (91/52 - 129/77)  RR: 18 (19 @ 06:13) (18 - 18)  SpO2: 99% (19 @ 19:53) (99% - 100%)    Mental Status:  _x___ Awake   _____ Alert   _____ Drowsy   _____ Sedated    Nausea/Vomiting:  _x___  NO       ______Yes,   See Post - Op Orders         Pain Scale (0-10):  __0___    Treatment: _x___ None    ____ See Post - Op/PCA Orders    Post - Operative Fluids:   __x__ Oral   ____ See Post - Op Orders    Plan: Discharge:   ____Home       _x ____Floor     _____Critical Care    _____  Other:_________________    Comments:  No anesthesia issues or complications noted.  Discharge when criteria met.

## 2019-05-05 NOTE — CHART NOTE - NSCHARTNOTEFT_GEN_A_CORE
PACU ANESTHESIA ADMISSION NOTE      Procedure:   Post op diagnosis:      ____  Intubated  TV:______       Rate: ______      FiO2: ______    ___x_  Patent Airway    ___x_  Full return of protective reflexes    _x___  Full recovery from anesthesia / back to baseline     Vitals:   T:           R:18                  BP:132/75                  Sat:  98                 P: 83      Mental Status:  _x___ Awake   __x___ Alert   _____ Drowsy   _____ Sedated    Nausea/Vomiting:  x____ NO  ______Yes,   See Post x- Op Orders          Pain Scale (0-10):  ___0__    Treatment: ____ None    ____ See Post x- Op/PCA Orders    Post - Operative Fluids:   ____ Oral   ____ See Post - Op Orders    Plan: Discharge:   ____Home      x _____Floor     _____Critical Care    _____  Other:_________________    Comments:

## 2019-05-05 NOTE — PROGRESS NOTE ADULT - SUBJECTIVE AND OBJECTIVE BOX
PGY 3 note    Patient seen at bedside and evaluated for labor progression.    VS  122/79 108 18 37.1 C  /mod nafisa+accels  TOCO q2-4  Abd: palpable ctx  SVE: 10/100/0      LABS:                        9.1    9.05  )-----------( 269      ( 04 May 2019 23:38 )             30.4     Antibody Screen: NEG (05-04 @ 23:38)    Urinalysis (05.04.19 @ 23:39)    Glucose Qualitative, Urine: Negative mg/dL    Blood, Urine: Large    pH Urine: 6.0    Color: Yellow    Urine Appearance: Cloudy    Bilirubin: Negative    Ketone - Urine: Negative    Specific Gravity: 1.015    Protein, Urine: Negative mg/dL    Urobilinogen: 0.2 mg/dL    Nitrite: Negative    Leukocyte Esterase Concentration: Large    B pos    RPR NR  HIV NR      MEDICATIONS  (STANDING):  lactated ringers. 1000 milliLiter(s) (125 mL/Hr) IV Continuous <Continuous>  1250 pitocin started, now on 12mu/min  sertraline 100 milliGRAM(s) Oral daily  0600 epidural

## 2019-05-05 NOTE — PROGRESS NOTE ADULT - ASSESSMENT
29 yo  at 38w6d, GBS negative, s/p SROM with IOL by cervidil, s/p epidural and pitocin, now arrest of descent, for  section  - ancef 2 grams  - IV fluids  - NPO  - pain management PRN  - on call to OR

## 2019-05-05 NOTE — PROGRESS NOTE ADULT - ASSESSMENT
27 yo P0 @ 38w5d, GBS neg, s/p cervidil, in second stage of labor, on pitocin  -Will continue to push  -pain management   -continue pitocin  -reassess    Dr. Joshi aware

## 2019-05-05 NOTE — BRIEF OPERATIVE NOTE - OPERATION/FINDINGS
Low transverse uterine incision via Pfannenstiel skin incision   Viable male infant delivered in vertex presentation, apgars 8/9, 3080g, normal appearing ovaries, fallopian tubes and uterus

## 2019-05-05 NOTE — BRIEF OPERATIVE NOTE - NSICDXBRIEFPOSTOP_GEN_ALL_CORE_FT
POST-OP DIAGNOSIS:  Arrest of descent, delivered, current hospitalization 05-May-2019 19:52:06  Lennie Delacruz  Delivery of pregnancy by  section 05-May-2019 19:51:52  Lennie Delacruz

## 2019-05-05 NOTE — BRIEF OPERATIVE NOTE - NSICDXBRIEFPREOP_GEN_ALL_CORE_FT
PRE-OP DIAGNOSIS:  Arrested labor 05-May-2019 19:51:21  Lennie Delacruz  38 weeks gestation of pregnancy 05-May-2019 19:50:18  Lennie Delacruz

## 2019-05-06 LAB
AMPHET UR-MCNC: NEGATIVE — SIGNIFICANT CHANGE UP
BARBITURATES UR SCN-MCNC: NEGATIVE — SIGNIFICANT CHANGE UP
BASOPHILS # BLD AUTO: 0.03 K/UL — SIGNIFICANT CHANGE UP (ref 0–0.2)
BASOPHILS NFR BLD AUTO: 0.2 % — SIGNIFICANT CHANGE UP (ref 0–1)
BENZODIAZ UR-MCNC: NEGATIVE — SIGNIFICANT CHANGE UP
BUPRENORPHINE SCREEN, URINE RESULT: NEGATIVE — SIGNIFICANT CHANGE UP
COCAINE METAB.OTHER UR-MCNC: NEGATIVE — SIGNIFICANT CHANGE UP
EOSINOPHIL # BLD AUTO: 0.03 K/UL — SIGNIFICANT CHANGE UP (ref 0–0.7)
EOSINOPHIL NFR BLD AUTO: 0.2 % — SIGNIFICANT CHANGE UP (ref 0–8)
HCT VFR BLD CALC: 22.4 % — LOW (ref 37–47)
HGB BLD-MCNC: 6.8 G/DL — CRITICAL LOW (ref 12–16)
IMM GRANULOCYTES NFR BLD AUTO: 0.7 % — HIGH (ref 0.1–0.3)
L&D DRUG SCREEN, URINE: SIGNIFICANT CHANGE UP
LYMPHOCYTES # BLD AUTO: 1.42 K/UL — SIGNIFICANT CHANGE UP (ref 1.2–3.4)
LYMPHOCYTES # BLD AUTO: 10.3 % — LOW (ref 20.5–51.1)
MCHC RBC-ENTMCNC: 22.9 PG — LOW (ref 27–31)
MCHC RBC-ENTMCNC: 30.4 G/DL — LOW (ref 32–37)
MCV RBC AUTO: 75.4 FL — LOW (ref 81–99)
METHADONE UR-MCNC: NEGATIVE — SIGNIFICANT CHANGE UP
MONOCYTES # BLD AUTO: 0.78 K/UL — HIGH (ref 0.1–0.6)
MONOCYTES NFR BLD AUTO: 5.7 % — SIGNIFICANT CHANGE UP (ref 1.7–9.3)
NEUTROPHILS # BLD AUTO: 11.4 K/UL — HIGH (ref 1.4–6.5)
NEUTROPHILS NFR BLD AUTO: 82.9 % — HIGH (ref 42.2–75.2)
NRBC # BLD: 0 /100 WBCS — SIGNIFICANT CHANGE UP (ref 0–0)
OPIATES UR-MCNC: NEGATIVE — SIGNIFICANT CHANGE UP
OXYCODONE UR-MCNC: NEGATIVE — SIGNIFICANT CHANGE UP
PCP UR-MCNC: NEGATIVE — SIGNIFICANT CHANGE UP
PLATELET # BLD AUTO: 232 K/UL — SIGNIFICANT CHANGE UP (ref 130–400)
PROPOXYPHENE QUALITATIVE URINE RESULT: NEGATIVE — SIGNIFICANT CHANGE UP
RBC # BLD: 2.97 M/UL — LOW (ref 4.2–5.4)
RBC # FLD: 16.1 % — HIGH (ref 11.5–14.5)
WBC # BLD: 13.76 K/UL — HIGH (ref 4.8–10.8)
WBC # FLD AUTO: 13.76 K/UL — HIGH (ref 4.8–10.8)

## 2019-05-06 RX ORDER — FERROUS SULFATE 325(65) MG
325 TABLET ORAL DAILY
Qty: 0 | Refills: 0 | Status: DISCONTINUED | OUTPATIENT
Start: 2019-05-06 | End: 2019-05-08

## 2019-05-06 RX ORDER — SODIUM CHLORIDE 9 MG/ML
3 INJECTION INTRAMUSCULAR; INTRAVENOUS; SUBCUTANEOUS EVERY 8 HOURS
Qty: 0 | Refills: 0 | Status: DISCONTINUED | OUTPATIENT
Start: 2019-05-06 | End: 2019-05-08

## 2019-05-06 RX ORDER — IBUPROFEN 200 MG
600 TABLET ORAL EVERY 6 HOURS
Qty: 0 | Refills: 0 | Status: DISCONTINUED | OUTPATIENT
Start: 2019-05-06 | End: 2019-05-08

## 2019-05-06 RX ORDER — SODIUM CHLORIDE 9 MG/ML
500 INJECTION, SOLUTION INTRAVENOUS ONCE
Qty: 0 | Refills: 0 | Status: DISCONTINUED | OUTPATIENT
Start: 2019-05-06 | End: 2019-05-06

## 2019-05-06 RX ADMIN — Medication 100 MILLIGRAM(S): at 18:37

## 2019-05-06 RX ADMIN — ENOXAPARIN SODIUM 40 MILLIGRAM(S): 100 INJECTION SUBCUTANEOUS at 13:07

## 2019-05-06 RX ADMIN — Medication 325 MILLIGRAM(S): at 13:06

## 2019-05-06 RX ADMIN — SIMETHICONE 80 MILLIGRAM(S): 80 TABLET, CHEWABLE ORAL at 22:45

## 2019-05-06 RX ADMIN — SODIUM CHLORIDE 3 MILLILITER(S): 9 INJECTION INTRAMUSCULAR; INTRAVENOUS; SUBCUTANEOUS at 21:50

## 2019-05-06 RX ADMIN — SIMETHICONE 80 MILLIGRAM(S): 80 TABLET, CHEWABLE ORAL at 06:43

## 2019-05-06 RX ADMIN — SIMETHICONE 80 MILLIGRAM(S): 80 TABLET, CHEWABLE ORAL at 02:39

## 2019-05-06 RX ADMIN — Medication 650 MILLIGRAM(S): at 02:39

## 2019-05-06 RX ADMIN — SIMETHICONE 80 MILLIGRAM(S): 80 TABLET, CHEWABLE ORAL at 18:38

## 2019-05-06 RX ADMIN — Medication 100 MILLIGRAM(S): at 13:11

## 2019-05-06 RX ADMIN — Medication 100 MILLIGRAM(S): at 03:52

## 2019-05-06 RX ADMIN — Medication 650 MILLIGRAM(S): at 06:43

## 2019-05-06 RX ADMIN — SIMETHICONE 80 MILLIGRAM(S): 80 TABLET, CHEWABLE ORAL at 18:39

## 2019-05-06 RX ADMIN — Medication 650 MILLIGRAM(S): at 21:24

## 2019-05-06 RX ADMIN — SIMETHICONE 80 MILLIGRAM(S): 80 TABLET, CHEWABLE ORAL at 18:37

## 2019-05-06 RX ADMIN — Medication 600 MILLIGRAM(S): at 18:38

## 2019-05-06 RX ADMIN — SODIUM CHLORIDE 3 MILLILITER(S): 9 INJECTION INTRAMUSCULAR; INTRAVENOUS; SUBCUTANEOUS at 18:38

## 2019-05-06 RX ADMIN — Medication 600 MILLIGRAM(S): at 13:06

## 2019-05-06 RX ADMIN — Medication 650 MILLIGRAM(S): at 16:13

## 2019-05-06 NOTE — PROGRESS NOTE ADULT - ATTENDING COMMENTS
Patient seen and evaluated  Pain well controlled. Moderate lochia.  Tolerating clears, denies nausea or vomiting. Has not yet passed flatus but feels rumblings.  Ambulated to chair last night, otherwise has been in bed.  Guy still in place with clear, yellow urine.  Attempting to breastfeed.    VSS  AAOx3  Abdomen soft, appropriately tender, mild distension. Fundus firm, below umbilicus  Incision C/D/I with suture and steristrips in place  Mild extremity edema    27yo s/p primary low transverse  section for arrest of descent, POD #1, doing well.  -Continue routine postop/postpartum care  -d/c Guy around 1030 this AM with trial of void  -Advance diet as tolerated  -Follow up AM labs  -Encourage ambulation, PO intake and breastfeeding

## 2019-05-06 NOTE — PROGRESS NOTE ADULT - ASSESSMENT
s/p primary c/s for arrest of dilation, POD 1 recovering well. Urine outpt adequate but concentrated.  bolus 500 cc  AM labs  pain management  clears for now  lovenox  fairchild to come out at 1030 then TOV    Will inform Dr. Joshi

## 2019-05-06 NOTE — PROGRESS NOTE ADULT - SUBJECTIVE AND OBJECTIVE BOX
PGY 3 Post Partum note    Subjective: some lower abdominal soreness    Breastfeeding: yes  Flatus: yes  BM: no  Voiding: fairchild in    Physical exam:    Vital Signs Last 24 Hrs  T(C): 37.6 (06 May 2019 01:31), Max: 37.6 (06 May 2019 01:31)  T(F): 99.7 (06 May 2019 01:31), Max: 99.7 (06 May 2019 01:31)  HR: 99 (06 May 2019 01:31) (67 - 109)  BP: 99/57 (06 May 2019 01:31) (91/52 - 130/63)  BP(mean): --  RR: 18 (06 May 2019 01:31) (18 - 18)  SpO2: 98% (06 May 2019 01:31) (97% - 100%)    U/O approx 700 cc (7311-7297)-->concentrated    Gen: NAD  CVS: RRR  Lungs: CTAB  Abdomen: Soft, nontender, no distension , firm uterine fundus at umbilicus, incision c/d/i  Pelvic: Normal lochia noted  Ext: No calf tenderness    Diet: clears    meds:   acetaminophen   Tablet ..   650 milliGRAM(s) Oral (05-06-19 @ 02:39)    ceFAZolin   IVPB   100 mL/Hr IV Intermittent (05-06-19 @ 03:52)    ceFAZolin   IVPB   100 mL/Hr IV Intermittent (05-05-19 @ 17:53)    oxytocin Infusion   2 mL/Hr IV Continuous (05-05-19 @ 12:42)    sertraline   100 milliGRAM(s) Oral (05-05-19 @ 08:50)    simethicone   80 milliGRAM(s) Chew (05-06-19 @ 02:39)        LABS:                        9.1    9.05  )-----------( 269      ( 04 May 2019 23:38 )             30.4

## 2019-05-07 ENCOUNTER — APPOINTMENT (OUTPATIENT)
Dept: OBGYN | Facility: CLINIC | Age: 29
End: 2019-05-07

## 2019-05-07 LAB
BASOPHILS # BLD AUTO: 0.05 K/UL — SIGNIFICANT CHANGE UP (ref 0–0.2)
BASOPHILS NFR BLD AUTO: 0.5 % — SIGNIFICANT CHANGE UP (ref 0–1)
EOSINOPHIL # BLD AUTO: 0.12 K/UL — SIGNIFICANT CHANGE UP (ref 0–0.7)
EOSINOPHIL NFR BLD AUTO: 1.3 % — SIGNIFICANT CHANGE UP (ref 0–8)
HCT VFR BLD CALC: 21.4 % — LOW (ref 37–47)
HGB BLD-MCNC: 6.4 G/DL — CRITICAL LOW (ref 12–16)
IMM GRANULOCYTES NFR BLD AUTO: 0.7 % — HIGH (ref 0.1–0.3)
LYMPHOCYTES # BLD AUTO: 1.66 K/UL — SIGNIFICANT CHANGE UP (ref 1.2–3.4)
LYMPHOCYTES # BLD AUTO: 17.3 % — LOW (ref 20.5–51.1)
MCHC RBC-ENTMCNC: 22.8 PG — LOW (ref 27–31)
MCHC RBC-ENTMCNC: 29.9 G/DL — LOW (ref 32–37)
MCV RBC AUTO: 76.2 FL — LOW (ref 81–99)
MONOCYTES # BLD AUTO: 0.52 K/UL — SIGNIFICANT CHANGE UP (ref 0.1–0.6)
MONOCYTES NFR BLD AUTO: 5.4 % — SIGNIFICANT CHANGE UP (ref 1.7–9.3)
NEUTROPHILS # BLD AUTO: 7.18 K/UL — HIGH (ref 1.4–6.5)
NEUTROPHILS NFR BLD AUTO: 74.8 % — SIGNIFICANT CHANGE UP (ref 42.2–75.2)
NRBC # BLD: 0 /100 WBCS — SIGNIFICANT CHANGE UP (ref 0–0)
PLATELET # BLD AUTO: 230 K/UL — SIGNIFICANT CHANGE UP (ref 130–400)
RBC # BLD: 2.81 M/UL — LOW (ref 4.2–5.4)
RBC # FLD: 16 % — HIGH (ref 11.5–14.5)
WBC # BLD: 9.6 K/UL — SIGNIFICANT CHANGE UP (ref 4.8–10.8)
WBC # FLD AUTO: 9.6 K/UL — SIGNIFICANT CHANGE UP (ref 4.8–10.8)

## 2019-05-07 RX ADMIN — Medication 325 MILLIGRAM(S): at 11:58

## 2019-05-07 RX ADMIN — SIMETHICONE 80 MILLIGRAM(S): 80 TABLET, CHEWABLE ORAL at 06:26

## 2019-05-07 RX ADMIN — Medication 650 MILLIGRAM(S): at 22:16

## 2019-05-07 RX ADMIN — Medication 600 MILLIGRAM(S): at 17:47

## 2019-05-07 RX ADMIN — SIMETHICONE 80 MILLIGRAM(S): 80 TABLET, CHEWABLE ORAL at 14:26

## 2019-05-07 RX ADMIN — Medication 600 MILLIGRAM(S): at 11:59

## 2019-05-07 RX ADMIN — SIMETHICONE 80 MILLIGRAM(S): 80 TABLET, CHEWABLE ORAL at 17:47

## 2019-05-07 RX ADMIN — Medication 100 MILLIGRAM(S): at 06:25

## 2019-05-07 RX ADMIN — SIMETHICONE 80 MILLIGRAM(S): 80 TABLET, CHEWABLE ORAL at 09:02

## 2019-05-07 RX ADMIN — SODIUM CHLORIDE 3 MILLILITER(S): 9 INJECTION INTRAMUSCULAR; INTRAVENOUS; SUBCUTANEOUS at 14:40

## 2019-05-07 RX ADMIN — Medication 650 MILLIGRAM(S): at 08:43

## 2019-05-07 RX ADMIN — Medication 600 MILLIGRAM(S): at 17:46

## 2019-05-07 RX ADMIN — Medication 650 MILLIGRAM(S): at 02:31

## 2019-05-07 RX ADMIN — Medication 650 MILLIGRAM(S): at 14:40

## 2019-05-07 RX ADMIN — Medication 100 MILLIGRAM(S): at 17:46

## 2019-05-07 RX ADMIN — SIMETHICONE 80 MILLIGRAM(S): 80 TABLET, CHEWABLE ORAL at 02:31

## 2019-05-07 RX ADMIN — Medication 600 MILLIGRAM(S): at 11:58

## 2019-05-07 RX ADMIN — Medication 650 MILLIGRAM(S): at 14:39

## 2019-05-07 RX ADMIN — SODIUM CHLORIDE 3 MILLILITER(S): 9 INJECTION INTRAMUSCULAR; INTRAVENOUS; SUBCUTANEOUS at 22:08

## 2019-05-07 RX ADMIN — Medication 600 MILLIGRAM(S): at 00:36

## 2019-05-07 RX ADMIN — ENOXAPARIN SODIUM 40 MILLIGRAM(S): 100 INJECTION SUBCUTANEOUS at 11:58

## 2019-05-07 RX ADMIN — SODIUM CHLORIDE 3 MILLILITER(S): 9 INJECTION INTRAMUSCULAR; INTRAVENOUS; SUBCUTANEOUS at 06:19

## 2019-05-07 RX ADMIN — Medication 600 MILLIGRAM(S): at 08:10

## 2019-05-07 RX ADMIN — SIMETHICONE 80 MILLIGRAM(S): 80 TABLET, CHEWABLE ORAL at 22:15

## 2019-05-07 RX ADMIN — Medication 600 MILLIGRAM(S): at 06:26

## 2019-05-07 NOTE — PROGRESS NOTE ADULT - SUBJECTIVE AND OBJECTIVE BOX
PGY 3 note    PGY 3 Post Partum note    Subjective: mild lower abdominal soreness    Breastfeeding: yes  Flatus: yes  BM: no  Voiding: yes    Physical exam:    Vital Signs Last 24 Hrs  T(C): 35.8 (07 May 2019 00:15), Max: 36.4 (06 May 2019 15:26)  T(F): 96.4 (07 May 2019 00:15), Max: 97.6 (06 May 2019 15:26)  HR: 79 (07 May 2019 00:15) (79 - 84)  BP: 92/50 (07 May 2019 00:15) (92/50 - 114/56)  BP(mean): --  RR: 18 (07 May 2019 00:15) (18 - 20)  SpO2: --    Gen: NAD  CVS: RRR  Lungs: CTAB  Abdomen: Soft, nontender, no distension , firm uterine fundus at umbilicus, incision c/d/i  Pelvic: Normal lochia noted  Ext: No calf tenderness    Diet: regular    meds:   acetaminophen   Tablet ..   650 milliGRAM(s) Oral (05-07-19 @ 02:31)   650 milliGRAM(s) Oral (05-06-19 @ 21:24)   650 milliGRAM(s) Oral (05-06-19 @ 16:13)   650 milliGRAM(s) Oral (05-06-19 @ 06:43)    ceFAZolin   IVPB   100 mL/Hr IV Intermittent (05-06-19 @ 13:11)    docusate sodium   100 milliGRAM(s) Oral (05-06-19 @ 18:37)    enoxaparin Injectable   40 milliGRAM(s) SubCutaneous (05-06-19 @ 13:07)    ferrous    sulfate   325 milliGRAM(s) Oral (05-06-19 @ 13:06)    ibuprofen  Tablet.   600 milliGRAM(s) Oral (05-07-19 @ 00:36)   600 milliGRAM(s) Oral (05-06-19 @ 18:38)   600 milliGRAM(s) Oral (05-06-19 @ 13:06)    simethicone   80 milliGRAM(s) Chew (05-07-19 @ 02:31)   80 milliGRAM(s) Chew (05-06-19 @ 22:45)   80 milliGRAM(s) Chew (05-06-19 @ 18:39)   80 milliGRAM(s) Chew (05-06-19 @ 18:38)   80 milliGRAM(s) Chew (05-06-19 @ 18:37)   80 milliGRAM(s) Chew (05-06-19 @ 06:43)    sodium chloride 0.9% lock flush   3 milliLiter(s) IV Push (05-06-19 @ 21:50)   3 milliLiter(s) IV Push (05-06-19 @ 18:38)        LABS:                        6.8    13.76 )-----------( 232      ( 06 May 2019 08:37 )             22.4

## 2019-05-07 NOTE — PROGRESS NOTE ADULT - SUBJECTIVE AND OBJECTIVE BOX
STATUS POST PRIMARY  SECTION DAY # 2  AFEBRILE AND VS STABLE; NO COMPLAINTS OF DIZZINESS OR LIGHTHEADEDNESS  DOING VERY WELL.  AMBULATORY. VOIDING WITHOUT COMPLAINTS. PASSING RECTAL FLATUS; NO BM YET.  TOLERATING DIET. NURSING WITHOUT COMPLAINTS.  PE: ABDOMEN SOFT, UTERUS FIRM. INCISION HEALING WELL       LOCHIA LIGHT       NO CALF TENDERNESS.    LABS: MOST RECENT HG 6.4   ( STARTED AT 9.1)     IMP: P OP  SECTION DAY # 2 DOING WELL         P OP ANEMIA ( ASYMPTOMATIC)      PLAN : REGULAR DIET AS TOLERATED              ENCOURAGE AMBULATION AND P O FLUIDS             PROBABLE DISCHARGE FOR TOMORROW             CONTINUE P O IRON

## 2019-05-07 NOTE — PROGRESS NOTE ADULT - ASSESSMENT
s/p primary c/s for arrest of descent, POD 2, recovering well, acute blood loss anemia, asymptomatic  f/u AM cbc  continue iron  lovenox  regular diet  ambulation  [facility] protocol for pain management    Will inform Dr. Cates

## 2019-05-08 ENCOUNTER — TRANSCRIPTION ENCOUNTER (OUTPATIENT)
Age: 29
End: 2019-05-08

## 2019-05-08 VITALS
RESPIRATION RATE: 18 BRPM | HEART RATE: 76 BPM | TEMPERATURE: 96 F | DIASTOLIC BLOOD PRESSURE: 66 MMHG | SYSTOLIC BLOOD PRESSURE: 117 MMHG

## 2019-05-08 LAB
MEV IGG SER-ACNC: 132 AU/ML — SIGNIFICANT CHANGE UP
MEV IGG+IGM SER-IMP: POSITIVE — SIGNIFICANT CHANGE UP

## 2019-05-08 RX ORDER — IBUPROFEN 200 MG
1 TABLET ORAL
Qty: 0 | Refills: 0 | DISCHARGE
Start: 2019-05-08

## 2019-05-08 RX ORDER — SERTRALINE 25 MG/1
1 TABLET, FILM COATED ORAL
Qty: 0 | Refills: 0 | DISCHARGE
Start: 2019-05-08

## 2019-05-08 RX ORDER — SERTRALINE 25 MG/1
1 TABLET, FILM COATED ORAL
Qty: 0 | Refills: 0 | COMMUNITY

## 2019-05-08 RX ORDER — FERROUS SULFATE 325(65) MG
1 TABLET ORAL
Qty: 60 | Refills: 0
Start: 2019-05-08 | End: 2019-06-06

## 2019-05-08 RX ORDER — ACETAMINOPHEN 500 MG
2 TABLET ORAL
Qty: 0 | Refills: 0 | DISCHARGE
Start: 2019-05-08

## 2019-05-08 RX ORDER — DOCUSATE SODIUM 100 MG
1 CAPSULE ORAL
Qty: 60 | Refills: 0
Start: 2019-05-08

## 2019-05-08 RX ADMIN — Medication 650 MILLIGRAM(S): at 10:37

## 2019-05-08 RX ADMIN — SIMETHICONE 80 MILLIGRAM(S): 80 TABLET, CHEWABLE ORAL at 06:46

## 2019-05-08 RX ADMIN — SODIUM CHLORIDE 3 MILLILITER(S): 9 INJECTION INTRAMUSCULAR; INTRAVENOUS; SUBCUTANEOUS at 06:36

## 2019-05-08 RX ADMIN — Medication 100 MILLIGRAM(S): at 06:46

## 2019-05-08 RX ADMIN — Medication 600 MILLIGRAM(S): at 06:46

## 2019-05-08 RX ADMIN — SIMETHICONE 80 MILLIGRAM(S): 80 TABLET, CHEWABLE ORAL at 02:41

## 2019-05-08 RX ADMIN — Medication 650 MILLIGRAM(S): at 02:41

## 2019-05-08 RX ADMIN — Medication 600 MILLIGRAM(S): at 00:24

## 2019-05-08 NOTE — DISCHARGE NOTE OB - CARE PROVIDER_API CALL
Bishop Joshi)  Obstetrics and Gynecology  [address] [place] [address]  Phone: [phone]  Fax: [phone]  Follow Up Time: 2 weeks

## 2019-05-08 NOTE — DISCHARGE NOTE OB - CARE PLAN
Principal Discharge DX:	 delivery delivered  Goal:	healthy patient  Assessment and plan of treatment:	 section, and anemia  dc home today  script sent  continue iron  precautions given

## 2019-05-08 NOTE — DISCHARGE NOTE OB - PATIENT PORTAL LINK FT
You can access the [facility] Patient Portal, offered by [facility] [employer], by registering with the following website: [email or web address]

## 2019-05-08 NOTE — DISCHARGE NOTE OB - MEDICATION SUMMARY - MEDICATIONS TO TAKE
I will START or STAY ON the medications listed below when I get home from the hospital:    acetaminophen 325 mg oral tablet  -- 2 tab(s) by mouth every 6 hours  -- Indication: For pain    ibuprofen 600 mg oral tablet  -- 1 tab(s) by mouth every 6 hours  -- Indication: For pain    Percocet 5/325 oral tablet  -- 1 tab(s) by mouth 4 times a day, As Needed MDD:6 tablets   -- Caution federal law prohibits the transfer of this drug to any person other  than the person for whom it was prescribed.  May cause drowsiness.  Alcohol may intensify this effect.  Use care when operating dangerous machinery.  This prescription cannot be refilled.  This product contains acetaminophen.  Do not use  with any other product containing acetaminophen to prevent possible liver damage.  Using more of this medication than prescribed may cause serious breathing problems.    -- Indication: For severe pain    sertraline 100 mg oral tablet  -- 1 tab(s) by mouth once a day  -- Indication: For depression/anxiety    Raz-Michael 325 mg (65 mg elemental iron) oral tablet  -- 1 tab(s) by mouth 2 times a day   -- Indication: For anemia    docusate sodium 100 mg oral capsule  -- 1 cap(s) by mouth 2 times a day  -- Indication: For sonctipation

## 2019-05-08 NOTE — PROGRESS NOTE ADULT - SUBJECTIVE AND OBJECTIVE BOX
PGY 3 Post Partum note    Subjective: no complaints    Breastfeeding: yes  Flatus: yes  BM: no  Voiding: yes    Physical exam:    Vital Signs Last 24 Hrs  T(C): 35.9 (08 May 2019 00:01), Max: 36.1 (07 May 2019 15:42)  T(F): 96.6 (08 May 2019 00:01), Max: 96.9 (07 May 2019 15:42)  HR: 92 (08 May 2019 00:01) (77 - 92)  BP: 119/75 (08 May 2019 00:01) (116/69 - 119/75)  BP(mean): --  RR: 20 (08 May 2019 00:01) (18 - 20)  SpO2: --    Gen: NAD  CVS: RRR  Lungs: CTAB  Abdomen: Soft, nontender, no distension , firm uterine fundus at umbilicus, c/d/i  Pelvic: Normal lochia noted  Ext: No calf tenderness    Diet: regular    meds:   acetaminophen   Tablet ..   650 milliGRAM(s) Oral (05-08-19 @ 02:41)   650 milliGRAM(s) Oral (05-07-19 @ 22:16)   650 milliGRAM(s) Oral (05-07-19 @ 14:39)   650 milliGRAM(s) Oral (05-07-19 @ 08:43)    docusate sodium   100 milliGRAM(s) Oral (05-08-19 @ 06:46)   100 milliGRAM(s) Oral (05-07-19 @ 17:46)    enoxaparin Injectable   40 milliGRAM(s) SubCutaneous (05-07-19 @ 11:58)    ferrous    sulfate   325 milliGRAM(s) Oral (05-07-19 @ 11:58)    ibuprofen  Tablet.   600 milliGRAM(s) Oral (05-08-19 @ 06:46)   600 milliGRAM(s) Oral (05-08-19 @ 00:24)   600 milliGRAM(s) Oral (05-07-19 @ 17:46)   600 milliGRAM(s) Oral (05-07-19 @ 11:58)    simethicone   80 milliGRAM(s) Chew (05-08-19 @ 06:46)   80 milliGRAM(s) Chew (05-08-19 @ 02:41)   80 milliGRAM(s) Chew (05-07-19 @ 22:15)   80 milliGRAM(s) Chew (05-07-19 @ 17:47)   80 milliGRAM(s) Chew (05-07-19 @ 14:26)   80 milliGRAM(s) Chew (05-07-19 @ 09:02)    sodium chloride 0.9% lock flush   3 milliLiter(s) IV Push (05-08-19 @ 06:36)   3 milliLiter(s) IV Push (05-07-19 @ 22:08)   3 milliLiter(s) IV Push (05-07-19 @ 14:40)        LABS:                        6.4    9.60  )-----------( 230      ( 07 May 2019 07:29 )             21.4                         6.8    13.76 )-----------( 232      ( 06 May 2019 08:37 )             22.4

## 2019-05-08 NOTE — PROGRESS NOTE ADULT - ATTENDING COMMENTS
Patient seen and evaluated  Pain well controlled. Moderate lochia.  Tolerating regular diet, denies nausea or vomiting. Has passed flatus, no BM.  Ambulating without difficulty, some dizziness yesterday that resolved with rest. Denies palpitation or shortness of breath.  Voiding feely.  Breastfeeding.    VSS  AAOx3  Abdomen soft, appropriately tender, non distended. Fundus firm, below umbilicus  Incision C/D/I with suture and steristrips in place  Mild extremity edema    Acute on chronic anemia  - Preop Hgb 9.1 --> postop 6.8 -> 6.4  - VSS, asymptomatic  - Oral iron supplementation    29yo s/p primary low transverse  section for arrest of descent, POD #3, doing well.  -Continue routine postop/postpartum care  -Encourage ambulation, PO intake and breastfeeding .  -Continue PO iron  -Stable for discharge home, instructions discussed

## 2019-05-08 NOTE — PROGRESS NOTE ADULT - ASSESSMENT
s/p primary c/s with arrest of descent, POD 3 recovering well, acute blood loss anemia, asymptomatic  dc home  iron, percocet and colace sent to pharmacy  precautions given    Will inform Dr. Joshi or Dr. Cates

## 2019-05-10 DIAGNOSIS — D62 ACUTE POSTHEMORRHAGIC ANEMIA: ICD-10-CM

## 2019-05-10 DIAGNOSIS — O61.0 FAILED MEDICAL INDUCTION OF LABOR: ICD-10-CM

## 2019-05-10 DIAGNOSIS — F41.9 ANXIETY DISORDER, UNSPECIFIED: ICD-10-CM

## 2019-05-10 DIAGNOSIS — O32.4XX0 MATERNAL CARE FOR HIGH HEAD AT TERM, NOT APPLICABLE OR UNSPECIFIED: ICD-10-CM

## 2019-05-10 DIAGNOSIS — Z3A.38 38 WEEKS GESTATION OF PREGNANCY: ICD-10-CM

## 2019-05-20 ENCOUNTER — APPOINTMENT (OUTPATIENT)
Dept: OBGYN | Facility: CLINIC | Age: 29
End: 2019-05-20
Payer: COMMERCIAL

## 2019-05-20 VITALS — HEIGHT: 62 IN | BODY MASS INDEX: 25.03 KG/M2 | WEIGHT: 136 LBS

## 2019-05-20 PROCEDURE — 99024 POSTOP FOLLOW-UP VISIT: CPT

## 2019-06-10 ENCOUNTER — APPOINTMENT (OUTPATIENT)
Dept: OBGYN | Facility: CLINIC | Age: 29
End: 2019-06-10
Payer: COMMERCIAL

## 2019-06-10 VITALS — WEIGHT: 131 LBS | HEIGHT: 62 IN | BODY MASS INDEX: 24.11 KG/M2

## 2019-06-10 PROCEDURE — 0503F POSTPARTUM CARE VISIT: CPT

## 2019-09-17 ENCOUNTER — APPOINTMENT (OUTPATIENT)
Dept: OBGYN | Facility: CLINIC | Age: 29
End: 2019-09-17

## 2019-09-30 ENCOUNTER — OTHER (OUTPATIENT)
Age: 29
End: 2019-09-30

## 2019-10-10 ENCOUNTER — OUTPATIENT (OUTPATIENT)
Dept: OUTPATIENT SERVICES | Facility: HOSPITAL | Age: 29
LOS: 1 days | Discharge: HOME | End: 2019-10-10

## 2019-10-10 ENCOUNTER — LABORATORY RESULT (OUTPATIENT)
Age: 29
End: 2019-10-10

## 2019-10-10 ENCOUNTER — APPOINTMENT (OUTPATIENT)
Dept: OBGYN | Facility: CLINIC | Age: 29
End: 2019-10-10
Payer: COMMERCIAL

## 2019-10-10 VITALS — WEIGHT: 128 LBS | BODY MASS INDEX: 23.41 KG/M2

## 2019-10-10 DIAGNOSIS — Z98.890 OTHER SPECIFIED POSTPROCEDURAL STATES: Chronic | ICD-10-CM

## 2019-10-10 LAB
BILIRUB UR QL STRIP: NORMAL
GLUCOSE UR-MCNC: NORMAL
HCG UR QL: NORMAL EU/DL
HGB UR QL STRIP.AUTO: NORMAL
KETONES UR-MCNC: NORMAL
LEUKOCYTE ESTERASE UR QL STRIP: NORMAL
NITRITE UR QL STRIP: NORMAL
PH UR STRIP: 6
PROT UR STRIP-MCNC: NORMAL
SP GR UR STRIP: 1.01

## 2019-10-10 PROCEDURE — 99395 PREV VISIT EST AGE 18-39: CPT

## 2019-10-11 DIAGNOSIS — Z01.419 ENCOUNTER FOR GYNECOLOGICAL EXAMINATION (GENERAL) (ROUTINE) WITHOUT ABNORMAL FINDINGS: ICD-10-CM

## 2020-04-15 ENCOUNTER — APPOINTMENT (OUTPATIENT)
Dept: OBGYN | Facility: CLINIC | Age: 30
End: 2020-04-15

## 2020-10-12 ENCOUNTER — APPOINTMENT (OUTPATIENT)
Dept: OBGYN | Facility: CLINIC | Age: 30
End: 2020-10-12
Payer: COMMERCIAL

## 2020-10-12 VITALS — HEIGHT: 62 IN | WEIGHT: 127 LBS | TEMPERATURE: 97 F | BODY MASS INDEX: 23.37 KG/M2

## 2020-10-12 LAB
BILIRUB UR QL STRIP: NORMAL
CLARITY UR: CLEAR
GLUCOSE UR-MCNC: NORMAL
HCG UR QL: NORMAL EU/DL
HGB UR QL STRIP.AUTO: NORMAL
KETONES UR-MCNC: NORMAL
LEUKOCYTE ESTERASE UR QL STRIP: NORMAL
NITRITE UR QL STRIP: NORMAL
PH UR STRIP: 5.5
PROT UR STRIP-MCNC: NORMAL
SP GR UR STRIP: 1.03

## 2020-10-12 PROCEDURE — 99395 PREV VISIT EST AGE 18-39: CPT

## 2020-10-12 PROCEDURE — 81003 URINALYSIS AUTO W/O SCOPE: CPT | Mod: QW

## 2020-10-13 ENCOUNTER — LABORATORY RESULT (OUTPATIENT)
Age: 30
End: 2020-10-13

## 2020-11-24 ENCOUNTER — NON-APPOINTMENT (OUTPATIENT)
Age: 30
End: 2020-11-24

## 2020-11-25 ENCOUNTER — NON-APPOINTMENT (OUTPATIENT)
Age: 30
End: 2020-11-25

## 2020-12-08 ENCOUNTER — APPOINTMENT (OUTPATIENT)
Dept: OBGYN | Facility: CLINIC | Age: 30
End: 2020-12-08
Payer: COMMERCIAL

## 2020-12-08 VITALS — HEIGHT: 62 IN | BODY MASS INDEX: 24.48 KG/M2 | WEIGHT: 133 LBS | TEMPERATURE: 97.7 F

## 2020-12-08 PROCEDURE — 99072 ADDL SUPL MATRL&STAF TM PHE: CPT

## 2020-12-08 PROCEDURE — 76830 TRANSVAGINAL US NON-OB: CPT

## 2020-12-08 PROCEDURE — 99213 OFFICE O/P EST LOW 20 MIN: CPT | Mod: 25

## 2020-12-22 ENCOUNTER — NON-APPOINTMENT (OUTPATIENT)
Age: 30
End: 2020-12-22

## 2020-12-22 PROBLEM — O21.9 NAUSEA AND VOMITING IN PREGNANCY: Status: ACTIVE | Noted: 2020-12-21

## 2020-12-28 ENCOUNTER — NON-APPOINTMENT (OUTPATIENT)
Age: 30
End: 2020-12-28

## 2020-12-29 ENCOUNTER — APPOINTMENT (OUTPATIENT)
Dept: OBGYN | Facility: CLINIC | Age: 30
End: 2020-12-29

## 2020-12-29 ENCOUNTER — NON-APPOINTMENT (OUTPATIENT)
Age: 30
End: 2020-12-29

## 2021-01-11 ENCOUNTER — APPOINTMENT (OUTPATIENT)
Dept: OBGYN | Facility: CLINIC | Age: 31
End: 2021-01-11

## 2021-01-12 ENCOUNTER — NON-APPOINTMENT (OUTPATIENT)
Age: 31
End: 2021-01-12

## 2021-01-12 ENCOUNTER — APPOINTMENT (OUTPATIENT)
Dept: OBGYN | Facility: CLINIC | Age: 31
End: 2021-01-12
Payer: COMMERCIAL

## 2021-01-12 VITALS
BODY MASS INDEX: 24.88 KG/M2 | TEMPERATURE: 97.8 F | WEIGHT: 136 LBS | DIASTOLIC BLOOD PRESSURE: 70 MMHG | SYSTOLIC BLOOD PRESSURE: 110 MMHG

## 2021-01-12 LAB
BILIRUB UR QL STRIP: NORMAL
GLUCOSE UR-MCNC: NORMAL
HCG UR QL: 0.2 EU/DL
HGB UR QL STRIP.AUTO: NORMAL
KETONES UR-MCNC: NORMAL
LEUKOCYTE ESTERASE UR QL STRIP: NORMAL
NITRITE UR QL STRIP: NORMAL
PH UR STRIP: 5
PROT UR STRIP-MCNC: NORMAL
SP GR UR STRIP: 1.03

## 2021-01-12 PROCEDURE — 0502F SUBSEQUENT PRENATAL CARE: CPT

## 2021-02-02 ENCOUNTER — NON-APPOINTMENT (OUTPATIENT)
Age: 31
End: 2021-02-02

## 2021-02-02 ENCOUNTER — APPOINTMENT (OUTPATIENT)
Dept: OBGYN | Facility: CLINIC | Age: 31
End: 2021-02-02
Payer: COMMERCIAL

## 2021-02-02 VITALS — TEMPERATURE: 97.7 F | BODY MASS INDEX: 25.61 KG/M2 | WEIGHT: 140 LBS

## 2021-02-02 LAB
BILIRUB UR QL STRIP: NORMAL
GLUCOSE UR-MCNC: NORMAL
HCG UR QL: 0.2 EU/DL
HGB UR QL STRIP.AUTO: NORMAL
KETONES UR-MCNC: NORMAL
LEUKOCYTE ESTERASE UR QL STRIP: NORMAL
NITRITE UR QL STRIP: NORMAL
PH UR STRIP: 6.5
PROT UR STRIP-MCNC: NORMAL
SP GR UR STRIP: 1.03

## 2021-02-02 PROCEDURE — 0502F SUBSEQUENT PRENATAL CARE: CPT

## 2021-02-13 ENCOUNTER — LABORATORY RESULT (OUTPATIENT)
Age: 31
End: 2021-02-13

## 2021-02-16 ENCOUNTER — NON-APPOINTMENT (OUTPATIENT)
Age: 31
End: 2021-02-16

## 2021-02-22 ENCOUNTER — APPOINTMENT (OUTPATIENT)
Dept: OBGYN | Facility: CLINIC | Age: 31
End: 2021-02-22

## 2021-02-22 ENCOUNTER — NON-APPOINTMENT (OUTPATIENT)
Age: 31
End: 2021-02-22

## 2021-02-22 ENCOUNTER — APPOINTMENT (OUTPATIENT)
Dept: OBGYN | Facility: CLINIC | Age: 31
End: 2021-02-22
Payer: COMMERCIAL

## 2021-02-22 VITALS — BODY MASS INDEX: 26.13 KG/M2 | WEIGHT: 142 LBS | HEIGHT: 62 IN | TEMPERATURE: 96.7 F

## 2021-02-22 PROCEDURE — 0502F SUBSEQUENT PRENATAL CARE: CPT

## 2021-03-04 ENCOUNTER — NON-APPOINTMENT (OUTPATIENT)
Age: 31
End: 2021-03-04

## 2021-03-04 ENCOUNTER — APPOINTMENT (OUTPATIENT)
Dept: OBGYN | Facility: CLINIC | Age: 31
End: 2021-03-04
Payer: COMMERCIAL

## 2021-03-04 PROCEDURE — 76805 OB US >/= 14 WKS SNGL FETUS: CPT

## 2021-03-04 PROCEDURE — 99072 ADDL SUPL MATRL&STAF TM PHE: CPT

## 2021-03-15 ENCOUNTER — NON-APPOINTMENT (OUTPATIENT)
Age: 31
End: 2021-03-15

## 2021-03-15 ENCOUNTER — APPOINTMENT (OUTPATIENT)
Dept: OBGYN | Facility: CLINIC | Age: 31
End: 2021-03-15
Payer: COMMERCIAL

## 2021-03-15 VITALS
DIASTOLIC BLOOD PRESSURE: 70 MMHG | SYSTOLIC BLOOD PRESSURE: 110 MMHG | WEIGHT: 149 LBS | BODY MASS INDEX: 27.25 KG/M2 | TEMPERATURE: 97.9 F

## 2021-03-15 PROCEDURE — 0502F SUBSEQUENT PRENATAL CARE: CPT

## 2021-03-21 LAB
BILIRUB UR QL STRIP: NORMAL
GLUCOSE UR-MCNC: NORMAL
HCG UR QL: 0.2 EU/DL
HGB UR QL STRIP.AUTO: NORMAL
KETONES UR-MCNC: NORMAL
LEUKOCYTE ESTERASE UR QL STRIP: NORMAL
NITRITE UR QL STRIP: NORMAL
PH UR STRIP: 6
PROT UR STRIP-MCNC: NORMAL
SP GR UR STRIP: 1.02

## 2021-04-03 ENCOUNTER — LABORATORY RESULT (OUTPATIENT)
Age: 31
End: 2021-04-03

## 2021-04-06 ENCOUNTER — NON-APPOINTMENT (OUTPATIENT)
Age: 31
End: 2021-04-06

## 2021-04-06 ENCOUNTER — APPOINTMENT (OUTPATIENT)
Dept: OBGYN | Facility: CLINIC | Age: 31
End: 2021-04-06
Payer: COMMERCIAL

## 2021-04-06 VITALS — TEMPERATURE: 98 F | WEIGHT: 254 LBS | BODY MASS INDEX: 46.46 KG/M2

## 2021-04-06 LAB
BILIRUB UR QL STRIP: NORMAL
GLUCOSE UR-MCNC: NORMAL
HCG UR QL: 0.2 EU/DL
HGB UR QL STRIP.AUTO: NORMAL
KETONES UR-MCNC: NORMAL
LEUKOCYTE ESTERASE UR QL STRIP: NORMAL
NITRITE UR QL STRIP: NORMAL
PH UR STRIP: 5.5
PROT UR STRIP-MCNC: NORMAL
SP GR UR STRIP: 1.03

## 2021-04-06 PROCEDURE — 0502F SUBSEQUENT PRENATAL CARE: CPT

## 2021-04-27 ENCOUNTER — NON-APPOINTMENT (OUTPATIENT)
Age: 31
End: 2021-04-27

## 2021-04-27 ENCOUNTER — APPOINTMENT (OUTPATIENT)
Dept: OBGYN | Facility: CLINIC | Age: 31
End: 2021-04-27
Payer: COMMERCIAL

## 2021-04-27 VITALS — WEIGHT: 154 LBS | TEMPERATURE: 98 F | BODY MASS INDEX: 28.17 KG/M2

## 2021-04-27 LAB
BILIRUB UR QL STRIP: NORMAL
GLUCOSE UR-MCNC: NORMAL
HCG UR QL: 0.2 EU/DL
HGB UR QL STRIP.AUTO: NORMAL
KETONES UR-MCNC: NORMAL
LEUKOCYTE ESTERASE UR QL STRIP: NORMAL
NITRITE UR QL STRIP: NORMAL
PH UR STRIP: 6
PROT UR STRIP-MCNC: NORMAL
SP GR UR STRIP: 1.01

## 2021-04-27 PROCEDURE — 0502F SUBSEQUENT PRENATAL CARE: CPT

## 2021-05-10 ENCOUNTER — NON-APPOINTMENT (OUTPATIENT)
Age: 31
End: 2021-05-10

## 2021-05-13 ENCOUNTER — TRANSCRIPTION ENCOUNTER (OUTPATIENT)
Age: 31
End: 2021-05-13

## 2021-05-19 ENCOUNTER — APPOINTMENT (OUTPATIENT)
Dept: OBGYN | Facility: CLINIC | Age: 31
End: 2021-05-19
Payer: COMMERCIAL

## 2021-05-19 VITALS
DIASTOLIC BLOOD PRESSURE: 70 MMHG | TEMPERATURE: 98 F | WEIGHT: 155 LBS | SYSTOLIC BLOOD PRESSURE: 110 MMHG | BODY MASS INDEX: 28.35 KG/M2

## 2021-05-19 DIAGNOSIS — Z87.42 PERSONAL HISTORY OF OTHER DISEASES OF THE FEMALE GENITAL TRACT: ICD-10-CM

## 2021-05-19 DIAGNOSIS — Z86.59 PERSONAL HISTORY OF OTHER MENTAL AND BEHAVIORAL DISORDERS: ICD-10-CM

## 2021-05-19 DIAGNOSIS — N91.1 SECONDARY AMENORRHEA: ICD-10-CM

## 2021-05-19 PROCEDURE — 0502F SUBSEQUENT PRENATAL CARE: CPT

## 2021-05-19 RX ORDER — DOXYLAMINE SUCCINATE AND PYRIDOXINE HYDROCHLORIDE 10; 10 MG/1; MG/1
10-10 TABLET, DELAYED RELEASE ORAL
Qty: 60 | Refills: 2 | Status: COMPLETED | COMMUNITY
Start: 2020-12-22 | End: 2021-05-19

## 2021-05-19 RX ORDER — DOXYLAMINE SUCCINATE AND PYRIDOXINE HYDROCHLORIDE 10; 10 MG/1; MG/1
10-10 TABLET, DELAYED RELEASE ORAL
Qty: 60 | Refills: 2 | Status: COMPLETED | COMMUNITY
Start: 2018-10-30 | End: 2021-05-19

## 2021-05-19 RX ORDER — NORETHINDRONE 0.35 MG/1
0.35 TABLET ORAL DAILY
Qty: 1 | Refills: 1 | Status: COMPLETED | COMMUNITY
Start: 2019-09-30 | End: 2021-05-19

## 2021-05-19 RX ORDER — NORETHINDRONE 0.35 MG/1
0.35 TABLET ORAL
Qty: 1 | Refills: 0 | Status: COMPLETED | COMMUNITY
Start: 2019-08-22 | End: 2021-05-19

## 2021-05-19 RX ORDER — NORETHINDRONE ACETATE AND ETHINYL ESTRADIOL, ETHINYL ESTRADIOL AND FERROUS FUMARATE 1MG-10(24)
1 MG-10 MCG / KIT ORAL
Qty: 84 | Refills: 1 | Status: COMPLETED | COMMUNITY
Start: 2019-10-10 | End: 2021-05-19

## 2021-05-19 RX ORDER — DOXYLAMINE SUCCINATE AND PYRIDOXINE HYDROCHLORIDE 10; 10 MG/1; MG/1
10-10 TABLET, DELAYED RELEASE ORAL
Qty: 60 | Refills: 2 | Status: COMPLETED | COMMUNITY
Start: 2020-12-21 | End: 2021-05-19

## 2021-05-19 RX ORDER — NORETHINDRONE 0.35 MG/1
0.35 TABLET ORAL
Qty: 90 | Refills: 0 | Status: COMPLETED | COMMUNITY
Start: 2019-06-10 | End: 2021-05-19

## 2021-05-20 LAB
BILIRUB UR QL STRIP: NORMAL
GLUCOSE UR-MCNC: NORMAL
HCG UR QL: 0.2 EU/DL
HGB UR QL STRIP.AUTO: NORMAL
KETONES UR-MCNC: NORMAL
LEUKOCYTE ESTERASE UR QL STRIP: NORMAL
NITRITE UR QL STRIP: NORMAL
PH UR STRIP: 6.5
PROT UR STRIP-MCNC: 30
SP GR UR STRIP: 1.03

## 2021-06-01 ENCOUNTER — NON-APPOINTMENT (OUTPATIENT)
Age: 31
End: 2021-06-01

## 2021-06-01 ENCOUNTER — APPOINTMENT (OUTPATIENT)
Dept: OBGYN | Facility: CLINIC | Age: 31
End: 2021-06-01
Payer: COMMERCIAL

## 2021-06-01 VITALS — TEMPERATURE: 98 F | BODY MASS INDEX: 28.53 KG/M2 | WEIGHT: 156 LBS

## 2021-06-01 LAB
BILIRUB UR QL STRIP: NORMAL
GLUCOSE UR-MCNC: NORMAL
HCG UR QL: 0.2 EU/DL
HGB UR QL STRIP.AUTO: NORMAL
KETONES UR-MCNC: NORMAL
LEUKOCYTE ESTERASE UR QL STRIP: NORMAL
NITRITE UR QL STRIP: NORMAL
PH UR STRIP: 7
PROT UR STRIP-MCNC: NORMAL
SP GR UR STRIP: 1.01

## 2021-06-01 PROCEDURE — 0502F SUBSEQUENT PRENATAL CARE: CPT

## 2021-06-02 ENCOUNTER — NON-APPOINTMENT (OUTPATIENT)
Age: 31
End: 2021-06-02

## 2021-06-09 ENCOUNTER — APPOINTMENT (OUTPATIENT)
Dept: OBGYN | Facility: CLINIC | Age: 31
End: 2021-06-09
Payer: COMMERCIAL

## 2021-06-09 PROCEDURE — 76820 UMBILICAL ARTERY ECHO: CPT

## 2021-06-09 PROCEDURE — 76815 OB US LIMITED FETUS(S): CPT

## 2021-06-09 PROCEDURE — 99072 ADDL SUPL MATRL&STAF TM PHE: CPT

## 2021-06-09 PROCEDURE — 76819 FETAL BIOPHYS PROFIL W/O NST: CPT

## 2021-06-10 ENCOUNTER — NON-APPOINTMENT (OUTPATIENT)
Age: 31
End: 2021-06-10

## 2021-06-11 ENCOUNTER — LABORATORY RESULT (OUTPATIENT)
Age: 31
End: 2021-06-11

## 2021-06-14 ENCOUNTER — LABORATORY RESULT (OUTPATIENT)
Age: 31
End: 2021-06-14

## 2021-06-15 ENCOUNTER — APPOINTMENT (OUTPATIENT)
Dept: OBGYN | Facility: CLINIC | Age: 31
End: 2021-06-15
Payer: COMMERCIAL

## 2021-06-15 ENCOUNTER — NON-APPOINTMENT (OUTPATIENT)
Age: 31
End: 2021-06-15

## 2021-06-15 VITALS — BODY MASS INDEX: 28.71 KG/M2 | WEIGHT: 156 LBS | HEIGHT: 62 IN | TEMPERATURE: 98 F

## 2021-06-15 PROCEDURE — 0502F SUBSEQUENT PRENATAL CARE: CPT

## 2021-06-23 ENCOUNTER — APPOINTMENT (OUTPATIENT)
Dept: OBGYN | Facility: CLINIC | Age: 31
End: 2021-06-23
Payer: COMMERCIAL

## 2021-06-23 VITALS
DIASTOLIC BLOOD PRESSURE: 70 MMHG | SYSTOLIC BLOOD PRESSURE: 110 MMHG | TEMPERATURE: 98 F | BODY MASS INDEX: 29.08 KG/M2 | WEIGHT: 159 LBS

## 2021-06-23 PROCEDURE — 0502F SUBSEQUENT PRENATAL CARE: CPT

## 2021-07-01 ENCOUNTER — NON-APPOINTMENT (OUTPATIENT)
Age: 31
End: 2021-07-01

## 2021-07-01 ENCOUNTER — APPOINTMENT (OUTPATIENT)
Dept: OBGYN | Facility: CLINIC | Age: 31
End: 2021-07-01
Payer: COMMERCIAL

## 2021-07-01 VITALS — WEIGHT: 159 LBS | BODY MASS INDEX: 29.26 KG/M2 | TEMPERATURE: 98.1 F | HEIGHT: 62 IN

## 2021-07-01 PROCEDURE — 0502F SUBSEQUENT PRENATAL CARE: CPT

## 2021-07-05 ENCOUNTER — OUTPATIENT (OUTPATIENT)
Dept: OUTPATIENT SERVICES | Facility: HOSPITAL | Age: 31
LOS: 1 days | Discharge: HOME | End: 2021-07-05

## 2021-07-05 ENCOUNTER — LABORATORY RESULT (OUTPATIENT)
Age: 31
End: 2021-07-05

## 2021-07-05 DIAGNOSIS — Z98.890 OTHER SPECIFIED POSTPROCEDURAL STATES: Chronic | ICD-10-CM

## 2021-07-05 DIAGNOSIS — Z11.59 ENCOUNTER FOR SCREENING FOR OTHER VIRAL DISEASES: ICD-10-CM

## 2021-07-08 ENCOUNTER — RESULT REVIEW (OUTPATIENT)
Age: 31
End: 2021-07-08

## 2021-07-08 ENCOUNTER — INPATIENT (INPATIENT)
Facility: HOSPITAL | Age: 31
LOS: 1 days | Discharge: HOME | End: 2021-07-10
Attending: OBSTETRICS & GYNECOLOGY | Admitting: OBSTETRICS & GYNECOLOGY
Payer: COMMERCIAL

## 2021-07-08 VITALS — DIASTOLIC BLOOD PRESSURE: 54 MMHG | HEART RATE: 88 BPM | SYSTOLIC BLOOD PRESSURE: 114 MMHG

## 2021-07-08 DIAGNOSIS — Z98.890 OTHER SPECIFIED POSTPROCEDURAL STATES: Chronic | ICD-10-CM

## 2021-07-08 DIAGNOSIS — Z98.891 HISTORY OF UTERINE SCAR FROM PREVIOUS SURGERY: Chronic | ICD-10-CM

## 2021-07-08 LAB
AMPHET UR-MCNC: NEGATIVE — SIGNIFICANT CHANGE UP
APPEARANCE UR: ABNORMAL
BACTERIA # UR AUTO: ABNORMAL
BARBITURATES UR SCN-MCNC: NEGATIVE — SIGNIFICANT CHANGE UP
BASOPHILS # BLD AUTO: 0.02 K/UL — SIGNIFICANT CHANGE UP (ref 0–0.2)
BASOPHILS NFR BLD AUTO: 0.3 % — SIGNIFICANT CHANGE UP (ref 0–1)
BENZODIAZ UR-MCNC: NEGATIVE — SIGNIFICANT CHANGE UP
BILIRUB UR-MCNC: NEGATIVE — SIGNIFICANT CHANGE UP
BLD GP AB SCN SERPL QL: SIGNIFICANT CHANGE UP
BUPRENORPHINE SCREEN, URINE RESULT: NEGATIVE — SIGNIFICANT CHANGE UP
COCAINE METAB.OTHER UR-MCNC: NEGATIVE — SIGNIFICANT CHANGE UP
COLOR SPEC: YELLOW — SIGNIFICANT CHANGE UP
DIFF PNL FLD: NEGATIVE — SIGNIFICANT CHANGE UP
EOSINOPHIL # BLD AUTO: 0.07 K/UL — SIGNIFICANT CHANGE UP (ref 0–0.7)
EOSINOPHIL NFR BLD AUTO: 0.9 % — SIGNIFICANT CHANGE UP (ref 0–8)
EPI CELLS # UR: 13 /HPF — HIGH (ref 0–5)
FENTANYL UR QL: NEGATIVE — SIGNIFICANT CHANGE UP
GLUCOSE UR QL: NEGATIVE — SIGNIFICANT CHANGE UP
HCT VFR BLD CALC: 35.5 % — LOW (ref 37–47)
HGB BLD-MCNC: 11.2 G/DL — LOW (ref 12–16)
HIV 1 & 2 AB SERPL IA.RAPID: SIGNIFICANT CHANGE UP
HYALINE CASTS # UR AUTO: 5 /LPF — SIGNIFICANT CHANGE UP (ref 0–7)
IMM GRANULOCYTES NFR BLD AUTO: 0.4 % — HIGH (ref 0.1–0.3)
KETONES UR-MCNC: NEGATIVE — SIGNIFICANT CHANGE UP
L&D DRUG SCREEN, URINE: SIGNIFICANT CHANGE UP
LEUKOCYTE ESTERASE UR-ACNC: ABNORMAL
LYMPHOCYTES # BLD AUTO: 1.17 K/UL — LOW (ref 1.2–3.4)
LYMPHOCYTES # BLD AUTO: 15.6 % — LOW (ref 20.5–51.1)
MCHC RBC-ENTMCNC: 24.6 PG — LOW (ref 27–31)
MCHC RBC-ENTMCNC: 31.5 G/DL — LOW (ref 32–37)
MCV RBC AUTO: 77.9 FL — LOW (ref 81–99)
METHADONE UR-MCNC: NEGATIVE — SIGNIFICANT CHANGE UP
MONOCYTES # BLD AUTO: 0.44 K/UL — SIGNIFICANT CHANGE UP (ref 0.1–0.6)
MONOCYTES NFR BLD AUTO: 5.9 % — SIGNIFICANT CHANGE UP (ref 1.7–9.3)
NEUTROPHILS # BLD AUTO: 5.76 K/UL — SIGNIFICANT CHANGE UP (ref 1.4–6.5)
NEUTROPHILS NFR BLD AUTO: 76.9 % — HIGH (ref 42.2–75.2)
NITRITE UR-MCNC: NEGATIVE — SIGNIFICANT CHANGE UP
NRBC # BLD: 0 /100 WBCS — SIGNIFICANT CHANGE UP (ref 0–0)
OPIATES UR-MCNC: NEGATIVE — SIGNIFICANT CHANGE UP
OXYCODONE UR-MCNC: NEGATIVE — SIGNIFICANT CHANGE UP
PCP UR-MCNC: NEGATIVE — SIGNIFICANT CHANGE UP
PH UR: 6.5 — SIGNIFICANT CHANGE UP (ref 5–8)
PLATELET # BLD AUTO: 259 K/UL — SIGNIFICANT CHANGE UP (ref 130–400)
PRENATAL SYPHILIS TEST: SIGNIFICANT CHANGE UP
PROPOXYPHENE QUALITATIVE URINE RESULT: NEGATIVE — SIGNIFICANT CHANGE UP
PROT UR-MCNC: ABNORMAL
RBC # BLD: 4.56 M/UL — SIGNIFICANT CHANGE UP (ref 4.2–5.4)
RBC # FLD: 16 % — HIGH (ref 11.5–14.5)
RBC CASTS # UR COMP ASSIST: 0 /HPF — SIGNIFICANT CHANGE UP (ref 0–4)
SP GR SPEC: 1.03 — SIGNIFICANT CHANGE UP (ref 1.01–1.03)
UROBILINOGEN FLD QL: SIGNIFICANT CHANGE UP
WBC # BLD: 7.49 K/UL — SIGNIFICANT CHANGE UP (ref 4.8–10.8)
WBC # FLD AUTO: 7.49 K/UL — SIGNIFICANT CHANGE UP (ref 4.8–10.8)
WBC UR QL: 39 /HPF — HIGH (ref 0–5)

## 2021-07-08 PROCEDURE — 13101 CMPLX RPR TRUNK 2.6-7.5 CM: CPT

## 2021-07-08 PROCEDURE — 59414 DELIVER PLACENTA: CPT

## 2021-07-08 PROCEDURE — S2140: CPT

## 2021-07-08 PROCEDURE — 88305 TISSUE EXAM BY PATHOLOGIST: CPT | Mod: 26

## 2021-07-08 RX ORDER — ACETAMINOPHEN 500 MG
975 TABLET ORAL
Refills: 0 | Status: DISCONTINUED | OUTPATIENT
Start: 2021-07-08 | End: 2021-07-09

## 2021-07-08 RX ORDER — OXYCODONE HYDROCHLORIDE 5 MG/1
5 TABLET ORAL
Refills: 0 | Status: COMPLETED | OUTPATIENT
Start: 2021-07-08 | End: 2021-07-15

## 2021-07-08 RX ORDER — SODIUM CHLORIDE 9 MG/ML
1000 INJECTION, SOLUTION INTRAVENOUS
Refills: 0 | Status: DISCONTINUED | OUTPATIENT
Start: 2021-07-08 | End: 2021-07-08

## 2021-07-08 RX ORDER — METOCLOPRAMIDE HCL 10 MG
10 TABLET ORAL ONCE
Refills: 0 | Status: DISCONTINUED | OUTPATIENT
Start: 2021-07-08 | End: 2021-07-10

## 2021-07-08 RX ORDER — CEFAZOLIN SODIUM 1 G
2000 VIAL (EA) INJECTION ONCE
Refills: 0 | Status: DISCONTINUED | OUTPATIENT
Start: 2021-07-08 | End: 2021-07-10

## 2021-07-08 RX ORDER — TETANUS TOXOID, REDUCED DIPHTHERIA TOXOID AND ACELLULAR PERTUSSIS VACCINE, ADSORBED 5; 2.5; 8; 8; 2.5 [IU]/.5ML; [IU]/.5ML; UG/.5ML; UG/.5ML; UG/.5ML
0.5 SUSPENSION INTRAMUSCULAR ONCE
Refills: 0 | Status: DISCONTINUED | OUTPATIENT
Start: 2021-07-08 | End: 2021-07-10

## 2021-07-08 RX ORDER — DIPHENHYDRAMINE HCL 50 MG
25 CAPSULE ORAL EVERY 6 HOURS
Refills: 0 | Status: COMPLETED | OUTPATIENT
Start: 2021-07-08 | End: 2022-06-06

## 2021-07-08 RX ORDER — SODIUM CHLORIDE 9 MG/ML
1000 INJECTION, SOLUTION INTRAVENOUS ONCE
Refills: 0 | Status: DISCONTINUED | OUTPATIENT
Start: 2021-07-08 | End: 2021-07-08

## 2021-07-08 RX ORDER — OXYTOCIN 10 UNIT/ML
VIAL (ML) INJECTION
Qty: 20 | Refills: 0 | Status: DISCONTINUED | OUTPATIENT
Start: 2021-07-08 | End: 2021-07-10

## 2021-07-08 RX ORDER — SIMETHICONE 80 MG/1
80 TABLET, CHEWABLE ORAL EVERY 4 HOURS
Refills: 0 | Status: DISCONTINUED | OUTPATIENT
Start: 2021-07-08 | End: 2021-07-10

## 2021-07-08 RX ORDER — MAGNESIUM HYDROXIDE 400 MG/1
30 TABLET, CHEWABLE ORAL
Refills: 0 | Status: DISCONTINUED | OUTPATIENT
Start: 2021-07-08 | End: 2021-07-10

## 2021-07-08 RX ORDER — ENOXAPARIN SODIUM 100 MG/ML
40 INJECTION SUBCUTANEOUS ONCE
Refills: 0 | Status: COMPLETED | OUTPATIENT
Start: 2021-07-09 | End: 2021-07-09

## 2021-07-08 RX ORDER — OXYCODONE HYDROCHLORIDE 5 MG/1
5 TABLET ORAL ONCE
Refills: 0 | Status: DISCONTINUED | OUTPATIENT
Start: 2021-07-08 | End: 2021-07-10

## 2021-07-08 RX ORDER — LANOLIN
1 OINTMENT (GRAM) TOPICAL EVERY 6 HOURS
Refills: 0 | Status: DISCONTINUED | OUTPATIENT
Start: 2021-07-08 | End: 2021-07-10

## 2021-07-08 RX ORDER — IBUPROFEN 200 MG
600 TABLET ORAL EVERY 6 HOURS
Refills: 0 | Status: COMPLETED | OUTPATIENT
Start: 2021-07-08 | End: 2022-06-06

## 2021-07-08 RX ORDER — FAMOTIDINE 10 MG/ML
20 INJECTION INTRAVENOUS ONCE
Refills: 0 | Status: DISCONTINUED | OUTPATIENT
Start: 2021-07-08 | End: 2021-07-08

## 2021-07-08 RX ORDER — CITRIC ACID/SODIUM CITRATE 300-500 MG
30 SOLUTION, ORAL ORAL ONCE
Refills: 0 | Status: DISCONTINUED | OUTPATIENT
Start: 2021-07-08 | End: 2021-07-08

## 2021-07-08 RX ORDER — OXYTOCIN 10 UNIT/ML
333.33 VIAL (ML) INJECTION
Qty: 20 | Refills: 0 | Status: DISCONTINUED | OUTPATIENT
Start: 2021-07-08 | End: 2021-07-10

## 2021-07-08 RX ORDER — SERTRALINE 25 MG/1
100 TABLET, FILM COATED ORAL DAILY
Refills: 0 | Status: DISCONTINUED | OUTPATIENT
Start: 2021-07-08 | End: 2021-07-10

## 2021-07-08 RX ORDER — SODIUM CHLORIDE 9 MG/ML
1000 INJECTION, SOLUTION INTRAVENOUS
Refills: 0 | Status: DISCONTINUED | OUTPATIENT
Start: 2021-07-08 | End: 2021-07-10

## 2021-07-08 NOTE — OB PROVIDER H&P - NS_OBGYNHISTORY_OBGYN_ALL_OB_FT
OB Hx: C/S x 1. Largest 6-12               G1 5/5/2019 FT C/S failure to descend M 6-12 [facility] No complications    Gyn Hx:  Denies cysts, fibroids, abnormal paps, and STIs.

## 2021-07-08 NOTE — OB RN INTRAOPERATIVE NOTE - NSSELHIDDEN_OBGYN_ALL_OB_FT
[NS_DeliveryAttending1_OBGYN_ALL_OB_FT:[id number]=],[NS_DeliveryRN_OBGYN_ALL_OB_FT:[id number]=]

## 2021-07-08 NOTE — PRE-ANESTHESIA EVALUATION ADULT - NSANTHPROCED_GEN_ALL_CORE
----- Message from Cristofer. Fernie Kendall sent at 8/30/2020  4:12 PM EDT -----  Regarding: Visit Follow-Up Question  Contact: [phone]  Mauricio,     I know that Dr Tino Falcon is out, but I tele met with her right before her leave started. I wanted to make sure the order to do blood work is still in. And also wanted to know if the lab is able to do a urine test.  I am having some lower pelvic and back pain. And I wan't to rule out a UTI. Can the lab test for that as well? Neuraxial Block

## 2021-07-08 NOTE — OB PROVIDER H&P - NSHPLABSRESULTS_GEN_ALL_CORE
GCT: 122    6/9/2021: 35.4 weeks: EFW 5lbs 14 oz, ant placenta,   3/4/2021: 21.0 weeks: EFW 13 oz, transverse, ant placenta, no gross morphology noted

## 2021-07-08 NOTE — OB RN DELIVERY SUMMARY - NSSELHIDDEN_OBGYN_ALL_OB_FT
[NS_DeliveryAttending1_OBGYN_ALL_OB_FT:[id number]=],[NS_DeliveryRN_OBGYN_ALL_OB_FT:[id number]=]

## 2021-07-08 NOTE — BRIEF OPERATIVE NOTE - OPERATION/FINDINGS
Low transverse uterine incision. Viable male infant delivered in vertex position, weighing 8-2, APGARs 9/9. Normal uterus, tubes and ovaries bilaterally.

## 2021-07-08 NOTE — OB PROVIDER DELIVERY SUMMARY - NSCSDELIVAASS_OBGYN_ALL_OB
Assisted

## 2021-07-08 NOTE — BRIEF OPERATIVE NOTE - NSICDXBRIEFPREOP_GEN_ALL_CORE_FT
PRE-OP DIAGNOSIS:  Term pregnancy 08-Jul-2021 18:59:07  Kitty Carlotn   PRE-OP DIAGNOSIS:  Term pregnancy 08-Jul-2021 18:59:07  Kitty Carlton  Keloid scar 08-Jul-2021 19:02:20  Kitty Carlton

## 2021-07-08 NOTE — OB RN PATIENT PROFILE - NS_FINALEDD_OBGYN_ALL_OB_DT
15-Jul-2021 Double Island Pedicle Flap Text: The defect edges were debeveled with a #15 scalpel blade.  Given the location of the defect, shape of the defect and the proximity to free margins a double island pedicle advancement flap was deemed most appropriate.  Using a sterile surgical marker, an appropriate advancement flap was drawn incorporating the defect, outlining the appropriate donor tissue and placing the expected incisions within the relaxed skin tension lines where possible.    The area thus outlined was incised deep to adipose tissue with a #15 scalpel blade.  The skin margins were undermined to an appropriate distance in all directions around the primary defect and laterally outward around the island pedicle utilizing iris scissors.  There was minimal undermining beneath the pedicle flap.

## 2021-07-08 NOTE — OB PROVIDER H&P - ASSESSMENT
30y  @ 39 weeks, IVF pregnancy, h/o of anxiety and prior c/s x 1, scheduled rpt c/s x 2.    Plan:  Admit to L&D  IVF  NPO diet  Continuous TOCO/EFM  Ancef prior to OR  Abdominal prep  SCDs B/L  Pain Management PRN    Dr. Joshi aware

## 2021-07-08 NOTE — BRIEF OPERATIVE NOTE - NSICDXBRIEFPOSTOP_GEN_ALL_CORE_FT
POST-OP DIAGNOSIS:  Term pregnancy 08-Jul-2021 18:59:12  Kitty Carlton   POST-OP DIAGNOSIS:  Keloid scar 08-Jul-2021 19:02:28  Kitty Carlton  Term pregnancy 08-Jul-2021 18:59:12  Kitty Carlton

## 2021-07-08 NOTE — OB PROVIDER H&P - HISTORY OF PRESENT ILLNESS
30y  @ 39 weeks by first trimester sonogram, NILSA 7/15/2021, h/o anxiety, IVF treatment, presents for scheduled repeat C/S. Pregnancy was conceived with frozen embryo. Embryo was implanted on day 5. One egg was implanted. Patient underwent IVF due to infertility. Patient has h/o prior C/S x 1. Denies VB, LOF, and ctx. +FM. No complications during this pregnancy.

## 2021-07-08 NOTE — CHART NOTE - NSCHARTNOTEFT_GEN_A_CORE
PACU ANESTHESIA ADMISSION NOTE      Procedure:   Post op diagnosis:      ____  Intubated  TV:______       Rate: ______      FiO2: ______    __x__  Patent Airway    __x__  Full return of protective reflexes    __x__  Full recovery from anesthesia / back to baseline status    Vitals:  T(C): 36.7 (07-08-21 @ 17:10), Max: 36.7 (07-08-21 @ 13:28)  HR: 86 (07-08-21 @ 17:10) (86 - 88)  BP: 114/54 (07-08-21 @ 17:10) (114/54 - 114/54)  RR: 18 (07-08-21 @ 17:10) (18 - 18)  SpO2: --    Mental Status:  __x__ Awake   ___x__ Alert   _____ Drowsy   _____ Sedated    Nausea/Vomiting:  __x__ NO  ______Yes,   See Post - Op Orders          Pain Scale (0-10):  _____    Treatment: ____ None    __x__ See Post - Op/PCA Orders    Post - Operative Fluids:   ____ Oral   __x__ See Post - Op Orders    Plan: Discharge:   ____Home       X_____Floor     _____Critical Care    _____  Other:_________________    Comments: Patient had smooth intraoperative event, no anesthesia complication.  PACU Vital signs: HR:      64      BP:   104     /  59        RR:     18        O2 Sat: 98      %     Temp 36c

## 2021-07-08 NOTE — OB PROVIDER DELIVERY SUMMARY - NSSELHIDDEN_OBGYN_ALL_OB_FT
[NS_DeliveryAttending1_OBGYN_ALL_OB_FT:[id number]],[NS_DeliveryRN_OBGYN_ALL_OB_FT:[id number]]

## 2021-07-08 NOTE — OB PROVIDER H&P - NSSCHADMISSION_OBGYN_A_OB
Render Note In Bullet Format When Appropriate: No Detail Level: Simple Duration Of Freeze Thaw-Cycle (Seconds): 0 Post-Care Instructions: I reviewed with the patient in detail post-care instructions. Patient is to wear sunprotection, and avoid picking at any of the treated lesions. Pt may apply Vaseline to crusted or scabbing areas. Consent: The patient's consent was obtained including but not limited to risks of crusting, scabbing, blistering, scarring, darker or lighter pigmentary change, recurrence, incomplete removal and infection. Yes

## 2021-07-09 LAB
BASOPHILS # BLD AUTO: 0.04 K/UL — SIGNIFICANT CHANGE UP (ref 0–0.2)
BASOPHILS NFR BLD AUTO: 0.4 % — SIGNIFICANT CHANGE UP (ref 0–1)
COVID-19 SPIKE DOMAIN AB INTERP: NEGATIVE — SIGNIFICANT CHANGE UP
COVID-19 SPIKE DOMAIN ANTIBODY RESULT: 0.4 U/ML — SIGNIFICANT CHANGE UP
EOSINOPHIL # BLD AUTO: 0.07 K/UL — SIGNIFICANT CHANGE UP (ref 0–0.7)
EOSINOPHIL NFR BLD AUTO: 0.8 % — SIGNIFICANT CHANGE UP (ref 0–8)
HCT VFR BLD CALC: 30.9 % — LOW (ref 37–47)
HGB BLD-MCNC: 9.4 G/DL — LOW (ref 12–16)
IMM GRANULOCYTES NFR BLD AUTO: 0.3 % — SIGNIFICANT CHANGE UP (ref 0.1–0.3)
LYMPHOCYTES # BLD AUTO: 1.16 K/UL — LOW (ref 1.2–3.4)
LYMPHOCYTES # BLD AUTO: 12.7 % — LOW (ref 20.5–51.1)
MCHC RBC-ENTMCNC: 24.1 PG — LOW (ref 27–31)
MCHC RBC-ENTMCNC: 30.4 G/DL — LOW (ref 32–37)
MCV RBC AUTO: 79.2 FL — LOW (ref 81–99)
MONOCYTES # BLD AUTO: 0.52 K/UL — SIGNIFICANT CHANGE UP (ref 0.1–0.6)
MONOCYTES NFR BLD AUTO: 5.7 % — SIGNIFICANT CHANGE UP (ref 1.7–9.3)
NEUTROPHILS # BLD AUTO: 7.33 K/UL — HIGH (ref 1.4–6.5)
NEUTROPHILS NFR BLD AUTO: 80.1 % — HIGH (ref 42.2–75.2)
NRBC # BLD: 0 /100 WBCS — SIGNIFICANT CHANGE UP (ref 0–0)
PLATELET # BLD AUTO: 193 K/UL — SIGNIFICANT CHANGE UP (ref 130–400)
RBC # BLD: 3.9 M/UL — LOW (ref 4.2–5.4)
RBC # FLD: 16 % — HIGH (ref 11.5–14.5)
SARS-COV-2 IGG+IGM SERPL QL IA: 0.4 U/ML — SIGNIFICANT CHANGE UP
SARS-COV-2 IGG+IGM SERPL QL IA: NEGATIVE — SIGNIFICANT CHANGE UP
WBC # BLD: 9.15 K/UL — SIGNIFICANT CHANGE UP (ref 4.8–10.8)
WBC # FLD AUTO: 9.15 K/UL — SIGNIFICANT CHANGE UP (ref 4.8–10.8)

## 2021-07-09 RX ORDER — ACETAMINOPHEN 500 MG
650 TABLET ORAL EVERY 6 HOURS
Refills: 0 | Status: DISCONTINUED | OUTPATIENT
Start: 2021-07-09 | End: 2021-07-10

## 2021-07-09 RX ORDER — ACETAMINOPHEN 500 MG
650 TABLET ORAL EVERY 6 HOURS
Refills: 0 | Status: DISCONTINUED | OUTPATIENT
Start: 2021-07-09 | End: 2021-07-09

## 2021-07-09 RX ORDER — OXYCODONE HYDROCHLORIDE 5 MG/1
5 TABLET ORAL
Refills: 0 | Status: DISCONTINUED | OUTPATIENT
Start: 2021-07-09 | End: 2021-07-10

## 2021-07-09 RX ORDER — IBUPROFEN 200 MG
600 TABLET ORAL EVERY 6 HOURS
Refills: 0 | Status: DISCONTINUED | OUTPATIENT
Start: 2021-07-09 | End: 2021-07-09

## 2021-07-09 RX ORDER — DIPHENHYDRAMINE HCL 50 MG
25 CAPSULE ORAL EVERY 6 HOURS
Refills: 0 | Status: DISCONTINUED | OUTPATIENT
Start: 2021-07-09 | End: 2021-07-10

## 2021-07-09 RX ORDER — IBUPROFEN 200 MG
600 TABLET ORAL EVERY 4 HOURS
Refills: 0 | Status: DISCONTINUED | OUTPATIENT
Start: 2021-07-09 | End: 2021-07-10

## 2021-07-09 RX ADMIN — Medication 650 MILLIGRAM(S): at 06:07

## 2021-07-09 RX ADMIN — Medication 600 MILLIGRAM(S): at 15:05

## 2021-07-09 RX ADMIN — SIMETHICONE 80 MILLIGRAM(S): 80 TABLET, CHEWABLE ORAL at 21:10

## 2021-07-09 RX ADMIN — Medication 25 MILLIGRAM(S): at 07:02

## 2021-07-09 RX ADMIN — Medication 600 MILLIGRAM(S): at 09:32

## 2021-07-09 RX ADMIN — Medication 650 MILLIGRAM(S): at 12:11

## 2021-07-09 RX ADMIN — Medication 650 MILLIGRAM(S): at 06:37

## 2021-07-09 RX ADMIN — Medication 650 MILLIGRAM(S): at 17:45

## 2021-07-09 RX ADMIN — SIMETHICONE 80 MILLIGRAM(S): 80 TABLET, CHEWABLE ORAL at 06:08

## 2021-07-09 RX ADMIN — SIMETHICONE 80 MILLIGRAM(S): 80 TABLET, CHEWABLE ORAL at 17:47

## 2021-07-09 RX ADMIN — SIMETHICONE 80 MILLIGRAM(S): 80 TABLET, CHEWABLE ORAL at 02:50

## 2021-07-09 RX ADMIN — ENOXAPARIN SODIUM 40 MILLIGRAM(S): 100 INJECTION SUBCUTANEOUS at 06:08

## 2021-07-09 RX ADMIN — Medication 650 MILLIGRAM(S): at 11:41

## 2021-07-09 RX ADMIN — Medication 600 MILLIGRAM(S): at 21:12

## 2021-07-09 RX ADMIN — SIMETHICONE 80 MILLIGRAM(S): 80 TABLET, CHEWABLE ORAL at 13:11

## 2021-07-09 RX ADMIN — SERTRALINE 100 MILLIGRAM(S): 25 TABLET, FILM COATED ORAL at 11:42

## 2021-07-09 RX ADMIN — Medication 600 MILLIGRAM(S): at 09:02

## 2021-07-09 RX ADMIN — Medication 1 TABLET(S): at 11:42

## 2021-07-09 NOTE — PROGRESS NOTE ADULT - SUBJECTIVE AND OBJECTIVE BOX
PGY1 Note:    Pt seen and evaluated at bedside. Pain well controlled. Patient has a fairchild in, not yet ambulating, and has not yet passed flatus. She is tolerating a clear liquid diet. Denies dizziness/lightheadedness/CP/SOB/palpitations. Denies fever, chills, nausea/vomiting, diarrhea, dysuria, LE pain.     Physical Exam  Vital Signs Last 24 Hrs  T(C): 36.2 (09 Jul 2021 00:11), Max: 36.7 (08 Jul 2021 13:28)  T(F): 97.2 (09 Jul 2021 00:11), Max: 98.1 (08 Jul 2021 21:57)  HR: 61 (09 Jul 2021 00:11) (50 - 88)  BP: 110/54 (09 Jul 2021 00:11) (101/56 - 117/68)  RR: 18 (09 Jul 2021 00:11) (18 - 18)  SpO2: 99% (08 Jul 2021 19:44) (99% - 100%)    Gen: AAOx3, NAD  Heart: RRR, S1S2+  Lungs: CTA B/L, no r/r/w   Fundus: firm, below umbilicus   Wound: Pfannenstiel incision, abdominal dressing in place with some serous drainage, d/i   Abd: Soft, nontender, nongravid  Lochia: minimal   Ext: No calf tenderness, no swelling    UO (min 36cc/hr): (3878-2575) 150cc, clear adequate urine    Labs:                        11.2   7.49  )-----------( 259      ( 08 Jul 2021 13:24 )             35.5        MEDICATIONS  (STANDING):  acetaminophen   Tablet .. 975 milliGRAM(s) Oral <User Schedule>  ceFAZolin   IVPB 2000 milliGRAM(s) IV Intermittent once  diphtheria/tetanus/pertussis (acellular) Vaccine (ADAcel) 0.5 milliLiter(s) IntraMuscular once  enoxaparin Injectable 40 milliGRAM(s) SubCutaneous once  ibuprofen  Tablet. 600 milliGRAM(s) Oral every 6 hours  lactated ringers. 1000 milliLiter(s) (200 mL/Hr) IV Continuous <Continuous>  metoclopramide Injectable 10 milliGRAM(s) IV Push once  prenatal multivitamin 1 Tablet(s) Oral daily  sertraline 100 milliGRAM(s) Oral daily  simethicone 80 milliGRAM(s) Chew every 4 hours    MEDICATIONS  (PRN):  diphenhydrAMINE 25 milliGRAM(s) Oral every 6 hours PRN Pruritus  lanolin Ointment 1 Application(s) Topical every 6 hours PRN Sore Nipples  magnesium hydroxide Suspension 30 milliLiter(s) Oral two times a day PRN Constipation  oxyCODONE    IR 5 milliGRAM(s) Oral every 3 hours PRN Moderate to Severe Pain (4-10)  oxyCODONE    IR 5 milliGRAM(s) Oral once PRN Moderate to Severe Pain (4-10)     PGY1 Note:    Pt seen and evaluated at bedside. Pain well controlled. Patient has a fairchild in, not yet ambulating, and has not yet passed flatus.   She is tolerating a clear liquid diet.   Denies dizziness/lightheadedness/CP/SOB/palpitations. Denies fever, chills, nausea/vomiting, diarrhea, LE pain.     Physical Exam  Vital Signs Last 24 Hrs  T(C): 36.2 (09 Jul 2021 00:11), Max: 36.7 (08 Jul 2021 13:28)  T(F): 97.2 (09 Jul 2021 00:11), Max: 98.1 (08 Jul 2021 21:57)  HR: 61 (09 Jul 2021 00:11) (50 - 88)  BP: 110/54 (09 Jul 2021 00:11) (101/56 - 117/68)  RR: 18 (09 Jul 2021 00:11) (18 - 18)  SpO2: 99% (08 Jul 2021 19:44) (99% - 100%)    Gen: AAOx3, NAD  Heart: RRR, S1S2+  Lungs: CTA B/L, no r/r/w   Fundus: firm, below umbilicus   Wound: Pfannenstiel incision, abdominal dressing in place with some serous drainage   Abd: Soft, nontender, nongravid  Lochia: minimal   Ext: No calf tenderness, no swelling    UO (min 36cc/hr): (1158-0300) 150cc, (3669-8851) 300cc in fairchild   clear adequate urine    Labs:                        11.2   7.49  )-----------( 259      ( 08 Jul 2021 13:24 )             35.5        MEDICATIONS  (STANDING):  acetaminophen   Tablet .. 975 milliGRAM(s) Oral <User Schedule>  ceFAZolin   IVPB 2000 milliGRAM(s) IV Intermittent once  diphtheria/tetanus/pertussis (acellular) Vaccine (ADAcel) 0.5 milliLiter(s) IntraMuscular once  enoxaparin Injectable 40 milliGRAM(s) SubCutaneous once  ibuprofen  Tablet. 600 milliGRAM(s) Oral every 6 hours  lactated ringers. 1000 milliLiter(s) (200 mL/Hr) IV Continuous <Continuous>  metoclopramide Injectable 10 milliGRAM(s) IV Push once  prenatal multivitamin 1 Tablet(s) Oral daily  sertraline 100 milliGRAM(s) Oral daily  simethicone 80 milliGRAM(s) Chew every 4 hours    MEDICATIONS  (PRN):  diphenhydrAMINE 25 milliGRAM(s) Oral every 6 hours PRN Pruritus  lanolin Ointment 1 Application(s) Topical every 6 hours PRN Sore Nipples  magnesium hydroxide Suspension 30 milliLiter(s) Oral two times a day PRN Constipation  oxyCODONE    IR 5 milliGRAM(s) Oral every 3 hours PRN Moderate to Severe Pain (4-10)  oxyCODONE    IR 5 milliGRAM(s) Oral once PRN Moderate to Severe Pain (4-10)

## 2021-07-09 NOTE — PROGRESS NOTE ADULT - ASSESSMENT
30y now P2 s/p repeat LTCS (c/s#2) POD#1, , h/o of anxiety, recovering well.     - fairchild removed, TOV 1200  - Zoloft 100 mg QD ordered for anxiety   - encourage ambulation, PO hydration  - f/u AM CBC   - monitor vitals, bleeding   - clear liquid diet  - encourage incentive spirometry   - pain mgmt prn   - simethicone  - routine postop care   - lovecharlax     Dr. Joshi and Dr. Owusu to be made aware

## 2021-07-09 NOTE — PROGRESS NOTE ADULT - ASSESSMENT
A/P: 30y P2 s/p repeat c/s POD 0, , h/o of anxiety on Zoloft 100 mg QD, recovering well  - clear liquid diet until flatus  - continue fluids @200cc/hr  - monitor vitals/bleeding   - encourage incentive spirometry   - advance diet as tolerated   - simethicone  - SCDs/lovenox for DVT prophylaxis   - f/u AM labs   - routine postop care     Dr. Molina and Dr. Joshi to be made aware A/P: 30y P2 s/p repeat LTCS (c/s#2) POD#0, , h/o of anxiety, recovering well.   -  on Zoloft 100 mg QD ordered for anxiety   - clear liquid diet until flatus  - continue fluids @200cc/hr  - monitor vitals/bleeding   - encourage incentive spirometry   - advance diet as tolerated   - simethicone  - SCDs/lovenox for DVT prophylaxis   - f/u AM labs   - routine postop care     Dr. Molina and Dr. Joshi to be made aware

## 2021-07-09 NOTE — PROGRESS NOTE ADULT - SUBJECTIVE AND OBJECTIVE BOX
PGY2 Note:  Patient seen and examined at bedside, recovering well, no acute complaints. Denies fever, chills, chest pain, SOB, nausea, vomiting, LE pain. Pain well-controlled with medication. Minimal bleeding, fairchild in place, passed flatus, tolerating clears. Not yet voiding, ambulating.    Physical Exam  Vital Signs Last 24 Hrs  T(C): 35.9 (09 Jul 2021 04:00), Max: 36.7 (08 Jul 2021 13:28)  T(F): 96.6 (09 Jul 2021 04:00), Max: 98.1 (08 Jul 2021 21:57)  HR: 63 (09 Jul 2021 04:00) (50 - 88)  BP: 90/54 (09 Jul 2021 04:00) (90/54 - 117/68)  RR: 18 (09 Jul 2021 04:00) (18 - 18)  SpO2: 99% (08 Jul 2021 19:44) (99% - 100%)    Gen: AAOx3, NAD  Heart: Normal S1S2, RRR, no m/r/g  Lungs: CTA b/l, no r/r/w   Fundus: firm, below umbilicus   Wound: steris and prolene in place, c/d/i   Abd: Soft, nontender, nondistended, BS+  Lochia: minimal  Ext: No calf tenderness, no swelling, SCDs in place  UO: (7775-8598) 110cc    Labs:                        11.2   7.49  )-----------( 259      ( 08 Jul 2021 13:24 )             35.5        acetaminophen    Suspension .. 650 milliGRAM(s) Oral every 6 hours  ceFAZolin   IVPB 2000 milliGRAM(s) IV Intermittent once  diphenhydrAMINE 25 milliGRAM(s) Oral every 6 hours PRN  diphtheria/tetanus/pertussis (acellular) Vaccine (ADAcel) 0.5 milliLiter(s) IntraMuscular once  ibuprofen  Suspension. 600 milliGRAM(s) Oral every 6 hours  lactated ringers. 1000 milliLiter(s) IV Continuous <Continuous>  lanolin Ointment 1 Application(s) Topical every 6 hours PRN  magnesium hydroxide Suspension 30 milliLiter(s) Oral two times a day PRN  metoclopramide Injectable 10 milliGRAM(s) IV Push once  oxyCODONE    IR 5 milliGRAM(s) Oral every 3 hours PRN  oxyCODONE    IR 5 milliGRAM(s) Oral once PRN  oxytocin Infusion  milliUNIT(s)/Min IV Continuous <Continuous>  oxytocin Infusion 333.333 milliUNIT(s)/Min IV Continuous <Continuous>  prenatal multivitamin 1 Tablet(s) Oral daily  sertraline 100 milliGRAM(s) Oral daily  simethicone 80 milliGRAM(s) Chew every 4 hours

## 2021-07-09 NOTE — PROGRESS NOTE ADULT - SUBJECTIVE AND OBJECTIVE BOX
Status post repeat  section day #1  Afebrile and vs stable  Ambulatory; voiding well since fairchild out this am.   Tolerating fluids  p.o.  PE abdomen softly distended and tympanic       incision healing well       lochia light       no calf tenderness    imp; Status post repeat  section doing well    plan; full liquid diet as tolerated          enocuraged ambulation          advance diet to regular tonight if doing well          possible discharge tomorrow.

## 2021-07-10 ENCOUNTER — TRANSCRIPTION ENCOUNTER (OUTPATIENT)
Age: 31
End: 2021-07-10

## 2021-07-10 VITALS
DIASTOLIC BLOOD PRESSURE: 61 MMHG | HEART RATE: 68 BPM | SYSTOLIC BLOOD PRESSURE: 106 MMHG | RESPIRATION RATE: 18 BRPM | TEMPERATURE: 96 F

## 2021-07-10 RX ORDER — SIMETHICONE 80 MG/1
1 TABLET, CHEWABLE ORAL
Qty: 0 | Refills: 0 | DISCHARGE
Start: 2021-07-10

## 2021-07-10 RX ORDER — ACETAMINOPHEN 500 MG
2 TABLET ORAL
Qty: 0 | Refills: 0 | DISCHARGE
Start: 2021-07-10

## 2021-07-10 RX ORDER — IBUPROFEN 200 MG
1 TABLET ORAL
Qty: 0 | Refills: 0 | DISCHARGE
Start: 2021-07-10

## 2021-07-10 RX ADMIN — Medication 650 MILLIGRAM(S): at 00:00

## 2021-07-10 RX ADMIN — Medication 600 MILLIGRAM(S): at 05:18

## 2021-07-10 RX ADMIN — Medication 600 MILLIGRAM(S): at 10:00

## 2021-07-10 RX ADMIN — Medication 650 MILLIGRAM(S): at 06:07

## 2021-07-10 RX ADMIN — Medication 600 MILLIGRAM(S): at 10:30

## 2021-07-10 RX ADMIN — Medication 25 MILLIGRAM(S): at 00:00

## 2021-07-10 RX ADMIN — SIMETHICONE 80 MILLIGRAM(S): 80 TABLET, CHEWABLE ORAL at 05:18

## 2021-07-10 RX ADMIN — Medication 650 MILLIGRAM(S): at 11:55

## 2021-07-10 RX ADMIN — SIMETHICONE 80 MILLIGRAM(S): 80 TABLET, CHEWABLE ORAL at 10:01

## 2021-07-10 NOTE — DISCHARGE NOTE OB - MEDICATION SUMMARY - MEDICATIONS TO STOP TAKING
I will STOP taking the medications listed below when I get home from the hospital:    Percocet 5/325 oral tablet  -- 1 tab(s) by mouth 4 times a day, As Needed MDD:6 tablets   -- Caution federal law prohibits the transfer of this drug to any person other  than the person for whom it was prescribed.  May cause drowsiness.  Alcohol may intensify this effect.  Use care when operating dangerous machinery.  This prescription cannot be refilled.  This product contains acetaminophen.  Do not use  with any other product containing acetaminophen to prevent possible liver damage.  Using more of this medication than prescribed may cause serious breathing problems.

## 2021-07-10 NOTE — DISCHARGE NOTE OB - PATIENT PORTAL LINK FT
You can access the FollowMyHealth Patient Portal offered by [facility] by registering at the following website: [email or web address]. By joining [employer]’s FollowMyHealth portal, you will also be able to view your health information using other applications (apps) compatible with our system.

## 2021-07-10 NOTE — DISCHARGE NOTE OB - MEDICATION SUMMARY - MEDICATIONS TO TAKE
I will START or STAY ON the medications listed below when I get home from the hospital:    acetaminophen 325 mg oral tablet  -- 2 tab(s) by mouth every 6 hours, As Needed  -- Indication: For pain    ibuprofen 600 mg oral tablet  -- 1 tab(s) by mouth every 4 hours, As Needed  -- Indication: For pain    Zoloft 100 mg oral tablet  -- 1 tab(s) by mouth once a day  -- Indication: For anxiety    simethicone 80 mg oral tablet, chewable  -- 1 tab(s) by mouth every 4 hours, As Needed  -- Indication: For gas

## 2021-07-10 NOTE — PROGRESS NOTE ADULT - SUBJECTIVE AND OBJECTIVE BOX
PATIENT SEEN AT 9:20AM TODAY.  P/OP  SECTION DAY # 2  AFEBRILE- VS STABLE  NO SPECIFIC COMPLAINTS  AMBULATORY, VOIDING WITHOUT COMPLAINTS; NO BM YET BUT PASSING RECTAL FLATUS WELL.  TOLERATING LIQUID DIET- TO START REGULAR DIET NOW;  NURSING WITHOUT COMPLAINTS.  PE;ABDOMEN SOFT, INCISION HEALING WELL; NO INDURATION       LOCHIA LIGHT      NO CALF TENDERNESS    IMP; STATUS POST REPEAT  SECTION DOING WELL    PLAN; REGULAR DIET; DISCHARGE HOME; ALL INSTRUCTIONS GIVEN; FOLLOW UP VISIT FOR INCISION CHECK IN 2 WKS            IBUPROFEN OR ACETOMINOPHEN FOR PAIN

## 2021-07-10 NOTE — PROGRESS NOTE ADULT - ASSESSMENT
30y now P2 s/p repeat LTCS (c/s#2) POD#2, , h/o of anxiety, recovering well.  -  Pain control PRN  -  encourage ambulation  -  PO hydration  - regular diet  -  DVT prophylaxis: ambulation, lovenox  - Discharge to home today  -Follow up in 2 weeks    Dr. Saavedra and Dr. Joshi  30y now P2 s/p LTCS #2, POD#2, h/o of anxiety, recovering well.  -  Pain control per routine, no toradol   -  encourage ambulation  -  PO hydration  -  regular diet  -  incentive spirometry encouraged  -  DVT prophylaxis: ambulation, lovenox, SCDs  -  Discharge to home today, postop precautions discussed  -  Follow up in 2 weeks    Dr. Saavedra and Dr. Joshi  30y now P2 s/p LTCS #2, POD#2, h/o of anxiety, recovering well.  -  Pain control per routine, no toradol   -  encourage ambulation  -  PO hydration  -  regular diet  -  incentive spirometry encouraged  -  DVT prophylaxis: ambulation, lovenox, SCDs  -  Discharge to home today, postop precautions discussed  -  Follow up in 2 weeks    Dr. Saavedra and Dr. Joshi aware.

## 2021-07-10 NOTE — DISCHARGE NOTE OB - ADDITIONAL INSTRUCTIONS
Nothing in the vagina for 6 weeks (no sex, no tampons, no douching). Avoid tub baths, you may shower.  If you have a fever of 100.4F or greater, severe vaginal bleeding, or severe abdominal pain, call your Ob/Gyn or come to the emergency department immediately.  Please follow up with your provider in 1 week for incision check, 6 weeks for postpartum visit. Nothing in the vagina for 6 weeks (no sex, no tampons, no douching). Avoid tub baths, you may shower.  If you have a fever of 100.4F or greater, severe vaginal bleeding, or severe abdominal pain, call your Ob/Gyn or come to the emergency department immediately.  Please follow up with your provider in 2 week for incision check, 6 weeks for postpartum visit.

## 2021-07-10 NOTE — DISCHARGE NOTE OB - PLAN OF CARE
Monitor vitals/labs.  To follow up with provider in 1 week for incision check, 6 weeks for postpartum visit. well-controlled continue home meds healthy recovery Monitor vitals/labs.  To follow up with provider in 2 week for incision check, 6 weeks for postpartum visit.

## 2021-07-10 NOTE — DISCHARGE NOTE OB - CARE PLAN
Principal Discharge DX:	 delivery delivered  Goal:	healthy recovery  Assessment and plan of treatment:	Monitor vitals/labs.  To follow up with provider in 1 week for incision check, 6 weeks for postpartum visit.  Secondary Diagnosis:	Anxiety  Goal:	well-controlled  Assessment and plan of treatment:	continue home meds   Principal Discharge DX:	 delivery delivered  Goal:	healthy recovery  Assessment and plan of treatment:	Monitor vitals/labs.  To follow up with provider in 2 week for incision check, 6 weeks for postpartum visit.  Secondary Diagnosis:	Anxiety  Goal:	well-controlled  Assessment and plan of treatment:	continue home meds

## 2021-07-10 NOTE — DISCHARGE NOTE OB - CARE PROVIDER_API CALL
Bishop Joshi  Obstetrics and Gynecology  [address] [place] [address]  Phone: [phone]  Fax: [phone]  Follow Up Time:

## 2021-07-10 NOTE — PROGRESS NOTE ADULT - SUBJECTIVE AND OBJECTIVE BOX
PGY1 Note  Chief Complaint: Post  section    Patient seen and examined. Pain well controlled at this time. No complaints at this time. Denies fever, chills, nausea, vomiting, chest pain, shortness of breath, severe abdominal pain, heavy vaginal bleeding. Patient is ambulating, passing flatus, tolerating regular diet, voiding without difficulty.   Physical Exam  Vital Signs Last 24 Hrs  T(F): 96.5 (10 Jul 2021 09:37), Max: 97.2 (2021 23:35)  HR: 68 (10 Jul 2021 09:37) (62 - 71)  BP: 106/61 (10 Jul 2021 09:37) (98/51 - 109/59)  RR: 18 (10 Jul 2021 09:37) (18 - 18)    Physical exam:  General - AAOx3, NAD  Heart - S1S2, RRR  Lungs - CTA BL  Abdomen:  - Soft, non- tender, mildly distended, BS+. Clean, dry, intact steri strips in place over pfannenstiel skin incision.  - Fundus firm, mildly, below the umbilicus  -no guarding or rebound  Pelvis/Vagina - minimal lochia  Extremities - No calf tenderness, no swelling    Labs:                        9.4    9.15  )-----------( 193      ( 2021 05:35 )             30.9                         11.2   7.49  )-----------( 259      ( 2021 13:24 )             35.5     Antibody Screen: NEG (21 @ 14:29)       PGY1 Note  Chief Complaint: Post  section    Patient seen and examined. Pain well controlled at this time. No complaints at this time. Denies fever, chills, nausea, vomiting, chest pain, shortness of breath, severe abdominal pain, heavy vaginal bleeding. Patient is ambulating, passing flatus, tolerating regular diet, voiding without difficulty.   Physical Exam    Vital Signs Last 24 Hrs  T(F): 96.5 (10 Jul 2021 09:37), Max: 97.2 (2021 23:35)  HR: 68 (10 Jul 2021 09:37) (62 - 71)  BP: 106/61 (10 Jul 2021 09:37) (98/51 - 109/59)  RR: 18 (10 Jul 2021 09:37) (18 - 18)    Physical exam:  General - AAOx3, NAD  Heart - S1S2, RRR  Lungs - CTA BL  Abdomen:  - Soft, non- tender, mildly distended, BS+. Clean, dry, intact steri strips in place over pfannenstiel skin incision.  - Fundus firm, mildly, below the umbilicus  -no guarding or rebound  Pelvis/Vagina - minimal lochia  Extremities - No calf tenderness, no swelling    Labs:                        9.4    9.15  )-----------( 193      ( 2021 05:35 )             30.9                         11.2   7.49  )-----------( 259      ( 2021 13:24 )             35.5     Antibody Screen: NEG (21 @ 14:29)    UDS neg  RPR NR  HIV NR  B pos

## 2021-07-12 ENCOUNTER — TRANSCRIPTION ENCOUNTER (OUTPATIENT)
Age: 31
End: 2021-07-12

## 2021-07-13 LAB — SURGICAL PATHOLOGY STUDY: SIGNIFICANT CHANGE UP

## 2021-07-15 DIAGNOSIS — L91.0 HYPERTROPHIC SCAR: ICD-10-CM

## 2021-07-15 DIAGNOSIS — Z20.822 CONTACT WITH AND (SUSPECTED) EXPOSURE TO COVID-19: ICD-10-CM

## 2021-07-15 DIAGNOSIS — O34.219 MATERNAL CARE FOR UNSPECIFIED TYPE SCAR FROM PREVIOUS CESAREAN DELIVERY: ICD-10-CM

## 2021-07-15 DIAGNOSIS — Z3A.39 39 WEEKS GESTATION OF PREGNANCY: ICD-10-CM

## 2021-07-22 ENCOUNTER — APPOINTMENT (OUTPATIENT)
Dept: OBGYN | Facility: CLINIC | Age: 31
End: 2021-07-22
Payer: COMMERCIAL

## 2021-07-22 VITALS — HEIGHT: 62 IN | BODY MASS INDEX: 25.58 KG/M2 | WEIGHT: 139 LBS | TEMPERATURE: 98.1 F

## 2021-07-22 PROCEDURE — ZZZZZ: CPT

## 2021-08-09 ENCOUNTER — NON-APPOINTMENT (OUTPATIENT)
Age: 31
End: 2021-08-09

## 2021-08-09 ENCOUNTER — APPOINTMENT (OUTPATIENT)
Dept: OBGYN | Facility: CLINIC | Age: 31
End: 2021-08-09
Payer: COMMERCIAL

## 2021-08-09 VITALS — HEIGHT: 62 IN | BODY MASS INDEX: 25.21 KG/M2 | WEIGHT: 137 LBS | TEMPERATURE: 98 F

## 2021-08-09 PROCEDURE — 0503F POSTPARTUM CARE VISIT: CPT

## 2021-08-19 ENCOUNTER — APPOINTMENT (OUTPATIENT)
Dept: OBGYN | Facility: CLINIC | Age: 31
End: 2021-08-19
Payer: COMMERCIAL

## 2021-08-19 VITALS — TEMPERATURE: 97.7 F | BODY MASS INDEX: 24.66 KG/M2 | WEIGHT: 134 LBS | HEIGHT: 62 IN

## 2021-08-19 DIAGNOSIS — Z34.90 ENCOUNTER FOR SUPERVISION OF NORMAL PREGNANCY, UNSPECIFIED, UNSPECIFIED TRIMESTER: ICD-10-CM

## 2021-08-19 PROCEDURE — 0503F POSTPARTUM CARE VISIT: CPT

## 2021-08-23 RX ORDER — NORETHINDRONE 0.35 MG/1
0.35 TABLET ORAL
Qty: 90 | Refills: 1 | Status: ACTIVE | COMMUNITY
Start: 2021-08-23 | End: 1900-01-01

## 2021-10-03 NOTE — OB RN PATIENT PROFILE - PROVIDER NOTIFICATION
CARDIOVASCULAR PROGRESS NOTE    Patient: Du Alvarez Date: 10/3/2021   YOB: 1975 Attending: Maykel Eli DO   46 year old male      Chief Complaint: SOB / LE edema    Summary of Hospitalization:   Du Alvarez is a 46 year old who presented to [facility] with c/o increased lower extremity edema, some shortness of breath, also with intermittent chest tightness on exertion over the last few weeks. Pt also found to be septic 2/2 LE cellulitis. ID and Surgery on board - Initial concern for necrotizing fascitis however workup was negative. ECHO 9/24 showing severe hypokinesis of the mid to distal anterior wall, anterior apex heading to the inferior apex and dave septum, EF 34% compared to echo in 2017 showing EF of 55%.  CXR on admission showing HF, pBNP of 30K, troponin <0.10. EKG shows poor R-wave progression in V1 through V3 suggestive of possible prior anteroseptal infarction; this is new compared to ECG 2017. Additonal hx includes hyperlipidemia, non compliance, DMT2 with A1C of , CKD stage III    Subjective:   Edema & breathing continue to improve.  Walked in max with improved AMARAL    Pertinent Reviewed: Allergies, medical history, surgical history, social history and medications. No changes from previous history and physical/consults documentation.    Medications/Infusions:  Scheduled:   • cephalexin  500 mg Oral 4 times per day   • mupirocin  1 application Topical Daily   • insulin lispro   Subcutaneous TID WC   • insulin lispro   Subcutaneous TID WC   • insulin glargine  25 Units Subcutaneous Nightly   • atorvastatin  80 mg Oral Nightly   • [Held by provider] metoPROLOL succinate  25 mg Oral Daily   • aspirin  81 mg Oral Daily   • [Held by provider] lisinopril  2.5 mg Oral Daily   • pantoprazole  40 mg Oral Nightly   • FLUoxetine  20 mg Oral Daily   • heparin (porcine)  5,000 Units Subcutaneous 3 times per day   • sodium chloride (PF)  2 mL Intracatheter 2 times per day   • Potassium  Standard Replacement Protocol   Does not apply See Admin Instructions   • Magnesium Standard Replacement Protocol   Does not apply See Admin Instructions       Continuous Infusions:   • milrinone (PRIMACOR) 20 mg/100 mL in dextrose 5 % infusion premix 0.25 mcg/kg/min (10/03/21 0531)   • furosemide (LASIX INJECT) 250 mg/125 mL in sodium chloride 0.9 % infusion 10 mg/hr (10/03/21 0531)       Physical Exam:     Vital Last Value 24 Hour Range   Temperature 97.6 °F (36.4 °C) (10/03/21 0736) Temp  Min: 97.6 °F (36.4 °C)  Max: 98.3 °F (36.8 °C)   Pulse (!) 110 (10/03/21 0521) Pulse  Min: 110  Max: 118   Respiratory 18 (10/03/21 0736) Resp  Min: 18  Max: 18   Non-invasive blood pressure 101/65 (10/03/21 0736) BP  Min: 92/57  Max: 110/70   Arterial  blood pressure   No data recorded   Pulse oximetry 98 % (10/03/21 0736) SpO2  Min: 94 %  Max: 98 %     Vital Today Admission   Weight 97.5 kg (214 lb 15.2 oz) (Reweight x2) (10/03/21 0521) Weight: 104.7 kg (230 lb 13.2 oz) (09/28/21 2016)     Weight over the past 48 Hours:  Patient Vitals for the past 48 hrs:   Weight   10/02/21 0525 100.5 kg (221 lb 9 oz)   10/03/21 0521 97.5 kg (214 lb 15.2 oz)        Hemodynamics:   Last Value 24 Hour Range   CVP   No data recorded   PAS/PAD   No data recorded   PCWP   No data recorded   CO   No data recorded   CI   No data recorded   SV02   No data recorded       Intake/Output Summary (Last 24 hours) at 10/3/2021 0800  Last data filed at 10/3/2021 0712  Gross per 24 hour   Intake 1654.27 ml   Output 4850 ml   Net -3195.73 ml       TELEMETRY (personal interpretation): ST 100s     CONSTITUTIONAL: Comfortable, no acute distress. Up in chair.   HEENT: Normocephalic, conjunctivae not pale, no scleral icterus. Moist mucous membranes. Pupils symmetrical.  NECK: + jugular venous distension, trachea midline.   RESPIRATORY: Normal respiratory effort. Lungs clear to auscultation bilaterally.  CARDIOVASCULAR: PMI not displaced. No precordial thrill. S1  and S2 normal intensity, regular rate and rhythm. +murmur.  ABDOMEN: rounded. Normoactive bowel sounds.  EXTREMITIES: Right wrist no swelling, bruising or tenderness. No bruit. Good distal pulses. Warm,  Extremities. BLE tight +3 pitting edema LLE pretibial dressed.  NEUROLOGIC: Alert and oriented x3. Cranial nerves 2-12 intact grossly. Moves all 4 extremities equally.      Results Reviewed:     Recent Labs   Lab 10/03/21  0443 10/02/21  0432 10/01/21  1400 10/01/21  0413 09/27/21  0518   WBC 12.1* 12.3*  --  13.5* 16.5*   HCT 28.0* 27.2*  --  26.8* 32.6*   HGB 9.2* 8.7*  --  8.7* 10.7*    284  --  265 456*   INR  --   --   --   --  1.2   SODIUM 133* 133*  --  132* 135   POTASSIUM 3.9 3.9 4.1 3.9 4.6   CHLORIDE 96* 99  --  100 104   CO2 30 28  --  26 24   GLUCOSE 135* 133*  --  124* 136*   BUN 79* 73*  --  66* 94*   CREATININE 2.40* 2.09*  --  1.73* 1.63*       Imaging:  [facility] 9/29/21  · Ost RCA to Prox RCA lesion with 100% stenosis.  · Prox Cx lesion with 90% stenosis.  · Mid Cx lesion with 90% stenosis.  · Dist Cx lesion with 90% stenosis.  · Mid LAD-1 lesion with 80% stenosis.  · Mid LAD-2 lesion with 60% stenosis.  · Dist LAD lesion with 60% stenosis.     Access:  R radial , 6 Fr.       Coronary Angiogram/[facility] Findings:  1.  Severe multivessel CAD  a. Findings per above, severe CAD with small diabetic vessels    b.  of the RCA system with filling from Left to Right collateral vessels   c. Left circumflex system with severe disease and multiple focal lesions  d. LAD with diffuse disease, 60-80% stenotic at sections     Intervention:   No intervention performed      Plan:  - Post-procedural monitoring and TR band removal per protocol.  - Will consult Dr Guerra with CV surgery for consideration of bypass and possible endarterectomy of the distal LAD   - 100cc/hr NS x 5 hours for LIANG   - Optimize current cardiac medication regimen increase statin, continue aspirin, continue beta blocker and start ACEi when  renal function recovers      ECHO 9/24/21  Definity contrast was utilized to better visualize the endocardial definition.  Moderately decreased left ventricular systolic function.  Left ventricular ejection fraction, 34 %.  Regional wall motion abnormalities (more pronounced hypokinesis of mid to distal anteroseptal, inferolateral walls and apex).  Appearance suggestive of stress cardiomyopathy.  Grade II/IV diastolic dysfunction, moderately elevated filling pressures.  Thickened and calcified tip of anterior mitral valve leaflet.  Mitral valve mean gradient is 3.2 mmHg at a heart rate of 96 bpm.  Eccentric posteriorly directed moderate mitral regurgitation jet.  Mild tricuspid valve regurgitation.  Right ventricular systolic pressure 34.1 mmHg.  As compared to previous echocardiogram dated 07/07/2017, images personally reviewed, Left ventricular ejection fraction  decreased, 34 % (LVEF 55% in previous study), mitral valve appearance the same but eccentric posteriorly directed moderate mitral  regurgitation jet now noted.  Epic message sent to Jefe Nieves and [facility].    ECHO 2015  Normal left ventricular systolic function. EF 55%. GLS -18.2%. Normal left ventricular cavity size. Normal left ventricular wall  thickness. Grade I/IV diastolic dysfunction (abnormal relaxation filling pattern), normal to mildly elevated filling pressures.  Normal right ventricular size and systolic function.  Unremarkable valvular structures.  Incomplete TR Doppler envelope precludes accurate assessment of PASP.    Assessment an Plan:   Severe multivessel coronary artery disease   - LHC as above. CTS following. Tentative CABG and MVR on 10/9  - Continue ASA/statin. BB on hold while Milrinone gtt on board  - Denies any angina    Acute combined biventricular EF / Ischemic Cardiomyopathy   - ECHO showing EF 34%. Continue Milrinone 0.25 mcg/kg/min. Hold BB. Diuretics per nephrology, Lasix gtt at 10 mg/hr.  Discussed with nephrology.  Unable to use ACE/ARB/Aldactone d/t CKD  - Cardiac diet / I & Os / daily wts / FR 1800    BLE Cellulitis with superficial wounds  - ID following. CT LE negative for osteo. Arterial US negative for occlusion    LIANG on CKD s/p renal bx 9/27  - Creat 1.4>1.7->2 -> 2.4 following angiogram.   - Monitor renal function   - Nephrology following    Moderate mitral valve regurgitation  - MVR on 10/9    Hyperlipidemia  - . Lipitor started this admission. PTA not on statin     DMT2  - A1C 16.1 in 2020, now 10.0    Noncompliance       - Continue Milrinone at fixed dose through the weekend while diuresing on Lasix gtt.   - Nephrology management of Lasix gtt 10mg. Creat 1.4 -> 2.4. will reach out to Nephrology  consideration to step back but continue diuresis. Good UO 5L. down 7kg 48hr per charting.    HAIM Fermin, [phone] / Dr MIRELLA Abdullahi weekend coverage for Dr Contreras  10/3/2021       Declines

## 2021-10-17 ENCOUNTER — OUTPATIENT (OUTPATIENT)
Dept: OUTPATIENT SERVICES | Facility: HOSPITAL | Age: 31
LOS: 1 days | Discharge: HOME | End: 2021-10-17

## 2021-10-17 DIAGNOSIS — Z98.890 OTHER SPECIFIED POSTPROCEDURAL STATES: Chronic | ICD-10-CM

## 2021-10-17 DIAGNOSIS — Z98.891 HISTORY OF UTERINE SCAR FROM PREVIOUS SURGERY: Chronic | ICD-10-CM

## 2021-10-17 DIAGNOSIS — Z11.59 ENCOUNTER FOR SCREENING FOR OTHER VIRAL DISEASES: ICD-10-CM

## 2021-10-20 ENCOUNTER — OUTPATIENT (OUTPATIENT)
Dept: OUTPATIENT SERVICES | Facility: HOSPITAL | Age: 31
LOS: 1 days | Discharge: HOME | End: 2021-10-20
Payer: COMMERCIAL

## 2021-10-20 ENCOUNTER — RESULT REVIEW (OUTPATIENT)
Age: 31
End: 2021-10-20

## 2021-10-20 VITALS
HEART RATE: 66 BPM | RESPIRATION RATE: 18 BRPM | DIASTOLIC BLOOD PRESSURE: 71 MMHG | HEIGHT: 62 IN | TEMPERATURE: 98 F | WEIGHT: 130.07 LBS | OXYGEN SATURATION: 100 % | SYSTOLIC BLOOD PRESSURE: 112 MMHG

## 2021-10-20 VITALS
OXYGEN SATURATION: 100 % | RESPIRATION RATE: 16 BRPM | DIASTOLIC BLOOD PRESSURE: 66 MMHG | SYSTOLIC BLOOD PRESSURE: 105 MMHG | HEART RATE: 76 BPM

## 2021-10-20 DIAGNOSIS — Z98.890 OTHER SPECIFIED POSTPROCEDURAL STATES: Chronic | ICD-10-CM

## 2021-10-20 DIAGNOSIS — Z98.891 HISTORY OF UTERINE SCAR FROM PREVIOUS SURGERY: Chronic | ICD-10-CM

## 2021-10-20 PROCEDURE — 88304 TISSUE EXAM BY PATHOLOGIST: CPT | Mod: 26

## 2021-10-20 RX ORDER — SODIUM CHLORIDE 9 MG/ML
1000 INJECTION, SOLUTION INTRAVENOUS
Refills: 0 | Status: DISCONTINUED | OUTPATIENT
Start: 2021-10-20 | End: 2021-11-03

## 2021-10-20 RX ORDER — SERTRALINE 25 MG/1
1 TABLET, FILM COATED ORAL
Qty: 0 | Refills: 0 | DISCHARGE

## 2021-10-20 RX ORDER — ONDANSETRON 8 MG/1
4 TABLET, FILM COATED ORAL ONCE
Refills: 0 | Status: DISCONTINUED | OUTPATIENT
Start: 2021-10-20 | End: 2021-11-03

## 2021-10-20 RX ORDER — IBUPROFEN 200 MG
1 TABLET ORAL
Qty: 20 | Refills: 0
Start: 2021-10-20

## 2021-10-20 RX ORDER — HYDROMORPHONE HYDROCHLORIDE 2 MG/ML
0.5 INJECTION INTRAMUSCULAR; INTRAVENOUS; SUBCUTANEOUS
Refills: 0 | Status: DISCONTINUED | OUTPATIENT
Start: 2021-10-20 | End: 2021-10-20

## 2021-10-20 RX ADMIN — SODIUM CHLORIDE 100 MILLILITER(S): 9 INJECTION, SOLUTION INTRAVENOUS at 08:35

## 2021-10-20 NOTE — ASU DISCHARGE PLAN (ADULT/PEDIATRIC) - CARE PROVIDER_API CALL
Freeman Walter)  Orthopaedic Surgery  [address] [place] [address]  Phone: [phone]  Fax: [phone]  Follow Up Time:

## 2021-10-20 NOTE — CHART NOTE - NSCHARTNOTEFT_GEN_A_CORE
PACU ANESTHESIA ADMISSION NOTE      Procedure: Excision of volar ganglion cyst of wrist      Post op diagnosis:  Ganglion cyst of volar aspect of left wrist        ____  Intubated  TV:______       Rate: ______      FiO2: ______    _x___  Patent Airway    __x__  Full return of protective reflexes    __x__  Full recovery from anesthesia / back to baseline     Vitals:   T:       36c    R:         16         BP: 101/60                 Sat:          100         P: 82      Mental Status:  x____ Awake   _x____ Alert   _____ Drowsy   _____ Sedated    Nausea/Vomiting:  x____ NO  ______Yes,   See Post - Op Orders          Pain Scale (0-10):  ___0__    Treatment: ____ None    ____ See Post - Op/PCA Orders    Post - Operative Fluids:   _x___ Oral   ____ See Post - Op Orders    Plan: Discharge:   __x__Home       _____Floor     _____Critical Care    _____  Other:_________________    Comments:

## 2021-10-20 NOTE — BRIEF OPERATIVE NOTE - NSICDXBRIEFPOSTOP_GEN_ALL_CORE_FT
POST-OP DIAGNOSIS:  Ganglion cyst of volar aspect of left wrist 20-Oct-2021 07:27:59  Freeman Walter

## 2021-10-20 NOTE — BRIEF OPERATIVE NOTE - NSICDXBRIEFPREOP_GEN_ALL_CORE_FT
PRE-OP DIAGNOSIS:  Ganglion cyst of volar aspect of left wrist 20-Oct-2021 07:27:51  Freeman Walter

## 2021-10-20 NOTE — ASU DISCHARGE PLAN (ADULT/PEDIATRIC) - ASU DC SPECIAL INSTRUCTIONSFT
DIET:    Resume normal diet  No alcoholic  beverages for 24 hours or if on prescribed narcotic pain medications.    MEDICATION:    Resume your preoperative oral medications.  Check with your physician before starting aspirin, Coumadin, or other blood thinners.  Prescriptions given to you - take as directed.      ACTIVITY:    Rest today and limit your activities for 24 hours.  Do not drive or operate machinery for 24 hours - if you received anesthesia.  When taking pain medication, be careful as you walk or climb stairs.  It is not advisable to drive while taking prescribed pain medication.    SPECIAL INSTRUCTIONS:    __x___ Elevate operative site above heart level or as directed.  __x___ Apply ice to operative site as directed.  _____ Use  sling as directed.  __x___ Exercise fingers.    DRESSING CARE:    _____ You may change the dressing 4 days. Keep wound covered with band-aids.  __x___ Do not change the dressing until your doctor see you.  _____ You can loosen or rewrap the dressing.  _____  Keep dressing clean and dry.  _____ You may shower in _____ day(s) with the extremity covered by a plastic bag.  _____ OK to wash hand , including showers, in _____ day(s).    ADDITIONAL  INFORMATION:    Post operative visit should be scheduled for next week.  If you are not aware of visit please contact office.  If you have any questions or concerns call office at      Notify your doctor if you develop   Fever  Excessive Swelling  Chills   Drainage   Pain not controlled by medication  Persistent numbness in hand or fingers    If an Emergency arises call 911 and/or go to the Emergency Room

## 2021-10-22 ENCOUNTER — APPOINTMENT (OUTPATIENT)
Dept: PLASTIC SURGERY | Facility: CLINIC | Age: 31
End: 2021-10-22

## 2021-10-22 DIAGNOSIS — Z30.011 ENCOUNTER FOR INITIAL PRESCRIPTION OF CONTRACEPTIVE PILLS: ICD-10-CM

## 2021-10-22 LAB — SURGICAL PATHOLOGY STUDY: SIGNIFICANT CHANGE UP

## 2021-10-22 RX ORDER — NORETHINDRONE ACETATE AND ETHINYL ESTRADIOL, ETHINYL ESTRADIOL AND FERROUS FUMARATE 1MG-10(24)
1 MG-10 MCG / KIT ORAL
Qty: 84 | Refills: 0 | Status: ACTIVE | COMMUNITY
Start: 2021-10-22 | End: 1900-01-01

## 2021-10-24 DIAGNOSIS — M67.432 GANGLION, LEFT WRIST: ICD-10-CM

## 2022-01-11 RX ORDER — NORETHINDRONE ACETATE AND ETHINYL ESTRADIOL, ETHINYL ESTRADIOL AND FERROUS FUMARATE 1MG-10(24)
1 MG-10 MCG / KIT ORAL DAILY
Qty: 1 | Refills: 0 | Status: ACTIVE | COMMUNITY
Start: 2022-01-11 | End: 1900-01-01

## 2022-01-18 ENCOUNTER — LABORATORY RESULT (OUTPATIENT)
Age: 32
End: 2022-01-18

## 2022-01-19 ENCOUNTER — APPOINTMENT (OUTPATIENT)
Dept: OBGYN | Facility: CLINIC | Age: 32
End: 2022-01-19
Payer: COMMERCIAL

## 2022-01-19 VITALS — HEIGHT: 62 IN | BODY MASS INDEX: 24.48 KG/M2 | WEIGHT: 133 LBS | TEMPERATURE: 97.8 F

## 2022-01-19 PROCEDURE — 99395 PREV VISIT EST AGE 18-39: CPT

## 2022-07-13 NOTE — PROCEDURAL SAFETY CHECKLIST WITH OR WITHOUT SEDATION - NSBEDADDLTIME8_GEN_ALL_CORE
not applicable Oxybutynin Counseling:  I discussed with the patient the risks of oxybutynin including but not limited to skin rash, drowsiness, dry mouth, difficulty urinating, and blurred vision.

## 2022-07-21 ENCOUNTER — APPOINTMENT (OUTPATIENT)
Dept: OBGYN | Facility: CLINIC | Age: 32
End: 2022-07-21

## 2022-07-21 VITALS — TEMPERATURE: 98 F | HEIGHT: 62 IN | WEIGHT: 130 LBS | BODY MASS INDEX: 23.92 KG/M2

## 2022-07-21 PROCEDURE — 99213 OFFICE O/P EST LOW 20 MIN: CPT

## 2022-07-21 RX ORDER — NORETHINDRONE ACETATE AND ETHINYL ESTRADIOL, ETHINYL ESTRADIOL AND FERROUS FUMARATE 1MG-10(24)
1 MG-10 MCG / KIT ORAL
Qty: 84 | Refills: 1 | Status: ACTIVE | COMMUNITY
Start: 2022-07-21 | End: 1900-01-01

## 2023-01-04 ENCOUNTER — LABORATORY RESULT (OUTPATIENT)
Age: 33
End: 2023-01-04

## 2023-01-05 ENCOUNTER — APPOINTMENT (OUTPATIENT)
Dept: OBGYN | Facility: CLINIC | Age: 33
End: 2023-01-05
Payer: COMMERCIAL

## 2023-01-05 VITALS — BODY MASS INDEX: 23.92 KG/M2 | WEIGHT: 130 LBS | TEMPERATURE: 97.2 F | HEIGHT: 62 IN

## 2023-01-05 PROCEDURE — 99395 PREV VISIT EST AGE 18-39: CPT

## 2023-01-05 RX ORDER — NORETHINDRONE ACETATE AND ETHINYL ESTRADIOL, ETHINYL ESTRADIOL AND FERROUS FUMARATE 1MG-10(24)
1 MG-10 MCG / KIT ORAL DAILY
Qty: 84 | Refills: 1 | Status: ACTIVE | COMMUNITY
Start: 2023-01-05 | End: 1900-01-01

## 2023-03-13 NOTE — PROCEDURAL SAFETY CHECKLIST WITH OR WITHOUT SEDATION - NSTEAMATTENDINGFT_GEN_ALL_CORE
Assessment/Plan:     1  GERD without esophagitis    2  Subclinical hypothyroidism    3  Depression with anxiety          Subjective:      Patient ID: Natalio Mckeon is a 80 y o  female  HPI   67-year-old female is here today for preop clearance for cataract surgery on left 3/28 and right 4/11  She currently is being evaluated for right upper quadrant right lower chest pain  Recent x-ray ruled out any acute pathology in the ribs or lung  She had a right upper quadrant ultrasound done today  Worsening right upper quadrant abdominal pain improved with pulm and with stress and eating  I have called the [place] reading room to have it read determine if there is any pathology to be done  If you ultrasound is negative she will was planning to have an EGD  Otherwise no new complaints  And chronic right upper quadrant pain for years however worsened over the last 6 months no burning throat pain with omeprazole  Reviewed recent labs from 12/22 and lipid panel, HbA1c, CMP within normal limits  Last TSh 2020 3 60, previous hightest at 8  Revised Cardiac risk index is 0 points - 3 9 % of cardiac event  ascvd Risk 7 9 % on crestor and lipid controlled  Last echo EF 55%, G1DD on 1/2021  RUQ US done today - no acute cholecystitis  Pt to follow up with GI regarding findings  The following portions of the patient's history were reviewed and updated as appropriate: allergies, current medications, past family history, past medical history, past social history, past surgical history, and problem list     Review of Systems   Constitutional: Negative for chills and fever  HENT: Negative for ear pain and sore throat  Eyes: Negative for pain and visual disturbance  Respiratory: Negative for cough and shortness of breath  Cardiovascular: Negative for chest pain and palpitations  Gastrointestinal: Positive for abdominal pain  Negative for vomiting  Genitourinary: Negative for dysuria and hematuria  Musculoskeletal: Negative for arthralgias and back pain  Skin: Negative for color change and rash  Neurological: Negative for seizures and syncope  All other systems reviewed and are negative  Objective:      /84   Pulse 65   Resp 16   Ht 5' 2" (1 575 m)   Wt 70 6 kg (155 lb 9 6 oz)   SpO2 96%   BMI 28 46 kg/m²          Physical Exam  Vitals and nursing note reviewed  Constitutional:       General: She is not in acute distress  Appearance: Normal appearance  She is not ill-appearing, toxic-appearing or diaphoretic  Comments: Mild RUQ tenderness chronic   HENT:      Head: Normocephalic and atraumatic  Eyes:      General:         Right eye: No discharge  Left eye: No discharge  Cardiovascular:      Rate and Rhythm: Normal rate and regular rhythm  Pulses: Normal pulses  Heart sounds: Normal heart sounds  Pulmonary:      Effort: Pulmonary effort is normal       Breath sounds: Normal breath sounds  Abdominal:      General: Abdomen is flat  There is no distension  Tenderness: There is abdominal tenderness  Skin:     General: Skin is warm  Neurological:      Mental Status: She is alert and oriented to person, place, and time     Psychiatric:         Mood and Affect: Mood normal          Behavior: Behavior normal  cy

## 2023-05-14 ENCOUNTER — NON-APPOINTMENT (OUTPATIENT)
Age: 33
End: 2023-05-14

## 2023-07-07 RX ORDER — NORETHINDRONE ACETATE AND ETHINYL ESTRADIOL, ETHINYL ESTRADIOL AND FERROUS FUMARATE 1MG-10(24)
1 MG-10 MCG / KIT ORAL
Qty: 1 | Refills: 0 | Status: ACTIVE | COMMUNITY
Start: 2022-01-19 | End: 1900-01-01

## 2023-07-16 ENCOUNTER — NON-APPOINTMENT (OUTPATIENT)
Age: 33
End: 2023-07-16

## 2023-07-17 ENCOUNTER — APPOINTMENT (OUTPATIENT)
Dept: OBGYN | Facility: CLINIC | Age: 33
End: 2023-07-17
Payer: COMMERCIAL

## 2023-07-17 VITALS — WEIGHT: 130 LBS | TEMPERATURE: 98 F | BODY MASS INDEX: 23.92 KG/M2 | HEIGHT: 62 IN

## 2023-07-17 PROCEDURE — 99213 OFFICE O/P EST LOW 20 MIN: CPT

## 2023-07-17 RX ORDER — NORETHINDRONE ACETATE AND ETHINYL ESTRADIOL, ETHINYL ESTRADIOL AND FERROUS FUMARATE 1MG-10(24)
1 MG-10 MCG / KIT ORAL
Qty: 84 | Refills: 1 | Status: ACTIVE | COMMUNITY
Start: 2023-07-17 | End: 1900-01-01

## 2024-01-24 ENCOUNTER — LABORATORY RESULT (OUTPATIENT)
Age: 34
End: 2024-01-24

## 2024-01-25 ENCOUNTER — APPOINTMENT (OUTPATIENT)
Dept: OBGYN | Facility: CLINIC | Age: 34
End: 2024-01-25
Payer: COMMERCIAL

## 2024-01-25 VITALS — WEIGHT: 140 LBS | BODY MASS INDEX: 25.76 KG/M2 | TEMPERATURE: 98.1 F | HEIGHT: 62 IN

## 2024-01-25 DIAGNOSIS — Z30.9 ENCOUNTER FOR CONTRACEPTIVE MANAGEMENT, UNSPECIFIED: ICD-10-CM

## 2024-01-25 DIAGNOSIS — N94.10 UNSPECIFIED DYSPAREUNIA: ICD-10-CM

## 2024-01-25 DIAGNOSIS — F41.9 ANXIETY DISORDER, UNSPECIFIED: ICD-10-CM

## 2024-01-25 DIAGNOSIS — Z01.419 ENCOUNTER FOR GYNECOLOGICAL EXAMINATION (GENERAL) (ROUTINE) W/OUT ABNORMAL FINDINGS: ICD-10-CM

## 2024-01-25 PROCEDURE — 99395 PREV VISIT EST AGE 18-39: CPT

## 2024-01-25 RX ORDER — NORETHINDRONE ACETATE AND ETHINYL ESTRADIOL, ETHINYL ESTRADIOL AND FERROUS FUMARATE 1MG-10(24)
1 MG-10 MCG / KIT ORAL DAILY
Qty: 84 | Refills: 1 | Status: ACTIVE | COMMUNITY
Start: 2024-01-25 | End: 1900-01-01

## 2024-01-26 PROBLEM — Z30.9 CONTRACEPTION MANAGEMENT: Status: ACTIVE | Noted: 2019-06-10

## 2024-01-26 PROBLEM — N94.10 DYSPAREUNIA IN FEMALE: Status: ACTIVE | Noted: 2023-07-17

## 2024-01-26 PROBLEM — Z01.419 WELL WOMAN EXAM: Status: ACTIVE | Noted: 2019-10-17

## 2024-01-26 PROBLEM — F41.9 ANXIETY: Status: ACTIVE | Noted: 2017-03-06

## 2024-01-26 NOTE — DISCUSSION/SUMMARY
[FreeTextEntry1] : 33 YEAR OLDF FEMALE LMP  ? ( ON LOW DOSE OCP AND DOESNT GET PERIODS) ON LO LOESTRIN FE  PRESENTS FOR WELL WOMAN GYNECOLOGIC EXAMINATION AND PAP .LAST SEEN FOR VISIT FOR SURVEILLANCE OF OCP 7/17/2023.  LAST WELL WOMAN VISIT AND PAP WAS DONE 1/5/2023; PAP AND HPV HR TESTING DONE AT THAT TIME WERE NEGATIVE.. NO NEW MEDICAL OR SURGICAL HISTORY SINCE LAST VISIT. MEDICATIONS; ZOLOFT 150 MG FOR ANXIETY                            OCP   LO LOESTRIN FE MEDICATION ALLERGIES; NONE ROS;BMS-NORMAL; NO FAM HISTORY OF  COLON CANCER          NO URINARYU COMPLAINTS          SEXUALLY ACTIVE WITH DISCOMFORT INITIALLY BREASTS; DOES BREAST SELF EXAMINATION AT TIMES                    NO FAM HISTORY OF BREAST CANCER +EXERCISES SOMEWHAT; + MULTIVITAMINS + DAIRY;- TOBACC OR VAPING  PE;/60; TEMP 98.1; WEIGHT 140 LBS; HEIGHT 5'2"        BREASTS; NO MASSES OR DISCHARGES        ABDOMEN;SOFT, NO MASSES; NO TENDERNESS TO PALPATION                            LOW TRANSVERSE SCAR        PELVIC; NROMAL EXTERNAL GENITALIA                       NORMAL VAGINA WITHOUT LESION                       NORMAL CERVIX WITHOUT LESION                       ANTEVERTED NORMAL UTERUS ON BIMANUAL EXAMINATION                       NO PALPABLE ADNEXAL MASSES  IMP; WELL WOMAN          CONTRACEPTIVE MANAGEMENT          MILD DYSPAREUNIA          ANXIETY  PLAN; THIN PREP PAP WITH HR HPV TESTING DONE-PT TO CALL IN 2 WKS FOR RESULTS             BREAST SELF EXAMINATION MONTHLY AGAIN STRONGLY ADVISED             E PRESCRIBED LOLO ESTRIN FE X 6 MONTHS TO [facility]             RETURN TO OFFICE 6 MONTHS FOR FOLLOW UP VISIT FOR SURVEILLANCE OF OCP

## 2024-02-24 ENCOUNTER — NON-APPOINTMENT (OUTPATIENT)
Age: 34
End: 2024-02-24

## 2024-07-16 RX ORDER — NORETHINDRONE ACETATE AND ETHINYL ESTRADIOL, ETHINYL ESTRADIOL AND FERROUS FUMARATE 1MG-10(24)
1 MG-10 MCG / KIT ORAL
Qty: 1 | Refills: 0 | Status: ACTIVE | COMMUNITY
Start: 2024-07-16 | End: 1900-01-01

## 2024-07-25 ENCOUNTER — APPOINTMENT (OUTPATIENT)
Dept: OBGYN | Facility: CLINIC | Age: 34
End: 2024-07-25
Payer: COMMERCIAL

## 2024-07-25 VITALS — HEIGHT: 62 IN | BODY MASS INDEX: 24.48 KG/M2 | WEIGHT: 133 LBS | TEMPERATURE: 98.1 F

## 2024-07-25 DIAGNOSIS — N94.10 UNSPECIFIED DYSPAREUNIA: ICD-10-CM

## 2024-07-25 DIAGNOSIS — Z30.41 ENCOUNTER FOR SURVEILLANCE OF CONTRACEPTIVE PILLS: ICD-10-CM

## 2024-07-25 DIAGNOSIS — Z30.9 ENCOUNTER FOR CONTRACEPTIVE MANAGEMENT, UNSPECIFIED: ICD-10-CM

## 2024-07-25 DIAGNOSIS — F41.9 ANXIETY DISORDER, UNSPECIFIED: ICD-10-CM

## 2024-07-25 PROCEDURE — 99213 OFFICE O/P EST LOW 20 MIN: CPT

## 2024-07-25 RX ORDER — NORETHINDRONE ACETATE AND ETHINYL ESTRADIOL, ETHINYL ESTRADIOL AND FERROUS FUMARATE 1MG-10(24)
1 MG-10 MCG / KIT ORAL DAILY
Qty: 84 | Refills: 1 | Status: ACTIVE | COMMUNITY
Start: 2024-07-25 | End: 1900-01-01

## 2024-07-28 PROBLEM — Z30.41 ENCOUNTER FOR SURVEILLANCE OF CONTRACEPTIVE PILLS: Status: ACTIVE | Noted: 2024-07-28

## 2024-07-28 NOTE — DISCUSSION/SUMMARY
[FreeTextEntry1] : 33 YEAR OLD FEMALE LMP 7/2/2024 ON OCP WITH CYCLES GENERALLY MONTHLY  BUT CAN OCCASIONALLY SKIP A PERIOD ( NO MISSED PILLS) PRESENTS FOR FOLLOW UP VISIT FOR CONTINUED SURVEILLANCE OF OCP. LAST SEEN FOR WELL WOMAN VISIT AND PAP 1/25/2024; PAP AND HPV HR TESTING DONE AT THAT TIME WERE NEGATIVE.  NO NEW MEDICAL OR SURGICAL HISTORY SINCE LAST VISIT. MEDICATIONS; LO LOESTRIN FE OCP; ZOLOFT 150 MG MEDICATION ALLERGIES; NONE ROS;BMS-NORMAL;NO FAM HISTORY OF COLON CANCER           NO URINARY COMPLAINTS           SEXUALLY ACTIVE WITH SOME DISCOMFORT INITIALLY AT INITIAL PENETRATION - OCP BREASTS; DOES OCCASIONAL BREAST SELF EXAMINATION                   NO FAM HISTORY OF BREAST CANCER +EXERCISE; + MULTIVITAMINS;+ DAIRY; -TOBACCO OR VAPING  PE;/68;TEMP 98L.1; WEIGHT 133 LBS; HEIGHT 5'2"       BREASTS; NO MASSES OR DISCHARGES      ABDOMEN;SOFT, NO MASSES; NO TENDERNESS TO PALPATION      PELVIC NORMAL EXTERNAL GENITALIA                   NORMAL URETHRAL MEATUS                   NORMAL VAGINA WITHOUT LESION                   NORMAL CERVIX WITHOUT LESION                   NORMAL ANTEVERATED UTERUS ON BIMANUAL EXAMINATION                   NO PALPABLE ADNEXAL MASSES  IMP; ENOCUNTER FOR SURVEILLANCE OF OCP         MILD DYSPAREUNIA         ANXIETY  PLAN; NO PAP DONE AT THIS TIME            BREAST SELF EXAMINATION MONTHLY AGAIN STRONLGY ADVISED             E PRESCRIBED LO LOESTRIN FE OCP [facility] MONTH[facility] [address]             RETURN TO OFFICE 6 MONTHS FOR WELL WOMAN VISTI AND PAP AND FOR FURTHER                    SURVEILLANCE OF OCP OR RTO PRN

## 2024-07-30 NOTE — OB RN PATIENT PROFILE - COMFORT/ACCEPTABLE PAIN LEVEL (0-10)
From: Shreya Ochoa  To: Kisha Lezama MD  Sent: 7/30/2024 3:29 PM CDT  Subject: Physical form signed by doctor    Hello. I had my physical done may 3 2024 and need to have the doctor sign my form saying I did complete. 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 I’ve attached the form to be completed by physician. Thanks in advance.   
5

## 2024-10-24 ENCOUNTER — APPOINTMENT (OUTPATIENT)
Dept: ORTHOPEDIC SURGERY | Facility: CLINIC | Age: 34
End: 2024-10-24
Payer: COMMERCIAL

## 2024-10-24 DIAGNOSIS — M25.552 PAIN IN LEFT HIP: ICD-10-CM

## 2024-10-24 DIAGNOSIS — M25.551 PAIN IN RIGHT HIP: ICD-10-CM

## 2024-10-24 PROCEDURE — 73522 X-RAY EXAM HIPS BI 3-4 VIEWS: CPT

## 2024-10-24 PROCEDURE — 99204 OFFICE O/P NEW MOD 45 MIN: CPT

## 2024-10-24 RX ORDER — MELOXICAM 15 MG/1
15 TABLET ORAL
Qty: 30 | Refills: 1 | Status: ACTIVE | COMMUNITY
Start: 2024-10-24 | End: 1900-01-01

## 2024-11-14 ENCOUNTER — APPOINTMENT (OUTPATIENT)
Dept: ORTHOPEDIC SURGERY | Facility: CLINIC | Age: 34
End: 2024-11-14

## 2024-11-14 DIAGNOSIS — M25.552 PAIN IN LEFT HIP: ICD-10-CM

## 2024-11-14 DIAGNOSIS — M25.551 PAIN IN RIGHT HIP: ICD-10-CM

## 2024-11-14 PROCEDURE — 99214 OFFICE O/P EST MOD 30 MIN: CPT | Mod: 25

## 2024-11-14 PROCEDURE — 20610 DRAIN/INJ JOINT/BURSA W/O US: CPT | Mod: RT

## 2024-12-05 ENCOUNTER — APPOINTMENT (OUTPATIENT)
Dept: ORTHOPEDIC SURGERY | Facility: CLINIC | Age: 34
End: 2024-12-05
Payer: COMMERCIAL

## 2024-12-05 ENCOUNTER — APPOINTMENT (OUTPATIENT)
Dept: PAIN MANAGEMENT | Facility: CLINIC | Age: 34
End: 2024-12-05
Payer: COMMERCIAL

## 2024-12-05 DIAGNOSIS — M25.552 PAIN IN LEFT HIP: ICD-10-CM

## 2024-12-05 DIAGNOSIS — M25.551 PAIN IN RIGHT HIP: ICD-10-CM

## 2024-12-05 PROCEDURE — 99204 OFFICE O/P NEW MOD 45 MIN: CPT

## 2024-12-05 PROCEDURE — 99213 OFFICE O/P EST LOW 20 MIN: CPT

## 2024-12-26 ENCOUNTER — APPOINTMENT (OUTPATIENT)
Dept: PAIN MANAGEMENT | Facility: CLINIC | Age: 34
End: 2024-12-26
Payer: COMMERCIAL

## 2024-12-26 DIAGNOSIS — M25.551 PAIN IN RIGHT HIP: ICD-10-CM

## 2024-12-26 DIAGNOSIS — M25.552 PAIN IN LEFT HIP: ICD-10-CM

## 2024-12-26 PROCEDURE — 20610 DRAIN/INJ JOINT/BURSA W/O US: CPT | Mod: 50

## 2024-12-26 PROCEDURE — 77002 NEEDLE LOCALIZATION BY XRAY: CPT

## 2025-01-09 ENCOUNTER — APPOINTMENT (OUTPATIENT)
Dept: PAIN MANAGEMENT | Facility: CLINIC | Age: 35
End: 2025-01-09
Payer: COMMERCIAL

## 2025-01-09 DIAGNOSIS — M25.551 PAIN IN RIGHT HIP: ICD-10-CM

## 2025-01-09 DIAGNOSIS — M25.552 PAIN IN LEFT HIP: ICD-10-CM

## 2025-01-09 PROCEDURE — 99214 OFFICE O/P EST MOD 30 MIN: CPT

## 2025-01-23 ENCOUNTER — APPOINTMENT (OUTPATIENT)
Dept: OBGYN | Facility: CLINIC | Age: 35
End: 2025-01-23
Payer: COMMERCIAL

## 2025-01-23 ENCOUNTER — LABORATORY RESULT (OUTPATIENT)
Age: 35
End: 2025-01-23

## 2025-01-23 VITALS — TEMPERATURE: 97.8 F | BODY MASS INDEX: 25.95 KG/M2 | HEIGHT: 62 IN | WEIGHT: 141 LBS

## 2025-01-23 DIAGNOSIS — F41.9 ANXIETY DISORDER, UNSPECIFIED: ICD-10-CM

## 2025-01-23 DIAGNOSIS — Z30.41 ENCOUNTER FOR SURVEILLANCE OF CONTRACEPTIVE PILLS: ICD-10-CM

## 2025-01-23 DIAGNOSIS — Z01.419 ENCOUNTER FOR GYNECOLOGICAL EXAMINATION (GENERAL) (ROUTINE) W/OUT ABNORMAL FINDINGS: ICD-10-CM

## 2025-01-23 LAB
APPEARANCE: CLEAR
BILIRUBIN URINE: NEGATIVE
BLOOD URINE: ABNORMAL
COLOR: YELLOW
GLUCOSE QUALITATIVE U: NEGATIVE
KETONES URINE: NEGATIVE
LEUKOCYTE ESTERASE URINE: ABNORMAL
NITRITE URINE: NEGATIVE
PH URINE: 5.5
PROTEIN URINE: ABNORMAL
SPECIFIC GRAVITY URINE: 1.02
UROBILINOGEN URINE: 0.2 (ref 0.2–?)

## 2025-01-23 PROCEDURE — 99395 PREV VISIT EST AGE 18-39: CPT

## 2025-01-23 RX ORDER — NORETHINDRONE ACETATE AND ETHINYL ESTRADIOL, ETHINYL ESTRADIOL AND FERROUS FUMARATE 1MG-10(24)
1 MG-10 MCG / KIT ORAL DAILY
Qty: 84 | Refills: 1 | Status: ACTIVE | COMMUNITY
Start: 2025-01-23 | End: 1900-01-01

## 2025-01-24 ENCOUNTER — APPOINTMENT (OUTPATIENT)
Dept: ORTHOPEDIC SURGERY | Facility: CLINIC | Age: 35
End: 2025-01-24
Payer: COMMERCIAL

## 2025-01-24 ENCOUNTER — RESULT CHARGE (OUTPATIENT)
Age: 35
End: 2025-01-24

## 2025-01-24 DIAGNOSIS — M25.561 PAIN IN RIGHT KNEE: ICD-10-CM

## 2025-01-24 DIAGNOSIS — S83.8X1A SPRAIN OF OTHER SPECIFIED PARTS OF RIGHT KNEE, INITIAL ENCOUNTER: ICD-10-CM

## 2025-01-24 PROCEDURE — 99214 OFFICE O/P EST MOD 30 MIN: CPT

## 2025-01-24 PROCEDURE — 73562 X-RAY EXAM OF KNEE 3: CPT | Mod: RT

## 2025-01-24 RX ORDER — DICLOFENAC SODIUM 10 MG/G
1 GEL TOPICAL
Qty: 1 | Refills: 0 | Status: ACTIVE | COMMUNITY
Start: 2025-01-24 | End: 1900-01-01

## 2025-02-06 ENCOUNTER — APPOINTMENT (OUTPATIENT)
Dept: GASTROENTEROLOGY | Facility: CLINIC | Age: 35
End: 2025-02-06

## 2025-02-20 ENCOUNTER — APPOINTMENT (OUTPATIENT)
Dept: ORTHOPEDIC SURGERY | Facility: CLINIC | Age: 35
End: 2025-02-20
Payer: COMMERCIAL

## 2025-02-20 DIAGNOSIS — M25.551 PAIN IN RIGHT HIP: ICD-10-CM

## 2025-02-20 PROCEDURE — 99213 OFFICE O/P EST LOW 20 MIN: CPT

## 2025-02-22 ENCOUNTER — NON-APPOINTMENT (OUTPATIENT)
Age: 35
End: 2025-02-22

## 2025-03-20 ENCOUNTER — APPOINTMENT (OUTPATIENT)
Dept: PAIN MANAGEMENT | Facility: CLINIC | Age: 35
End: 2025-03-20

## 2025-04-10 ENCOUNTER — APPOINTMENT (OUTPATIENT)
Dept: PAIN MANAGEMENT | Facility: CLINIC | Age: 35
End: 2025-04-10

## 2025-07-24 ENCOUNTER — NON-APPOINTMENT (OUTPATIENT)
Age: 35
End: 2025-07-24

## 2025-07-24 ENCOUNTER — APPOINTMENT (OUTPATIENT)
Dept: OBGYN | Facility: CLINIC | Age: 35
End: 2025-07-24
Payer: COMMERCIAL

## 2025-07-24 VITALS — HEIGHT: 62 IN | BODY MASS INDEX: 24.84 KG/M2 | WEIGHT: 135 LBS | TEMPERATURE: 97.7 F

## 2025-07-24 DIAGNOSIS — Z30.41 ENCOUNTER FOR SURVEILLANCE OF CONTRACEPTIVE PILLS: ICD-10-CM

## 2025-07-24 DIAGNOSIS — Z30.9 ENCOUNTER FOR CONTRACEPTIVE MANAGEMENT, UNSPECIFIED: ICD-10-CM

## 2025-07-24 DIAGNOSIS — F41.9 ANXIETY DISORDER, UNSPECIFIED: ICD-10-CM

## 2025-07-24 LAB
APPEARANCE: CLEAR
BILIRUBIN URINE: NEGATIVE
BLOOD URINE: ABNORMAL
COLOR: YELLOW
GLUCOSE QUALITATIVE U: NEGATIVE
KETONES URINE: NEGATIVE
LEUKOCYTE ESTERASE URINE: ABNORMAL
NITRITE URINE: NEGATIVE
PH URINE: 5.5
PROTEIN URINE: NEGATIVE
SPECIFIC GRAVITY URINE: >=1.03
UROBILINOGEN URINE: 0.2 (ref 0.2–?)

## 2025-07-24 PROCEDURE — 99213 OFFICE O/P EST LOW 20 MIN: CPT

## 2025-07-30 NOTE — OB PROVIDER TRIAGE NOTE - NS_SONOPERFORMEDBY_OBGYN_ALL_OB_FT
[facility]  Office: [phone]  Kris Loyd DO, Anup Mantilla DO, Pratik Yeung DO, Blaze Valerio DO, Shayna Blount MD, Eun Castellano MD, Tristian Grullon MD, Ninoska Remy MD,  Jaime Perry MD, Kendal Milligan MD, Ernie Corey MD,  Joanna Husain DO, Francis Loyd DO, Winnie Colin MD,  Luis Felipe Arnold DO, Rachel Miranda MD, Ratna Call MD, Quinn Langston MD,  Laci Burton MD, Tani Dexter MD, Savanah Diamond MD, [place] MD, Tyler Keyes MD, Chris Layne MD, Gary Montano DO, Kira Garcia MD, Amanda Moran DO, Adelso Giles MD, Momo Heath MD, Joanna Tafoya MD, Mohsin Reza, MD, Lindsey Muñoz MD, Shirley Waterhouse, CNP,  Rita Leary, CNP, Gary Friend, CNP,  Makayla Salazar, DNP, Farnaz Torres, CNP, Lisa Delgadillo, CNP, Yareli Almanza, CNP, Sarah Bliss, CNP, Lelia Olivera, PA-C, Maile Quinonez, CNP, Adrian Davis, CNP,  Ashley Figueroa, CNP, Sera Hunt, CNP, Jeffy Dodson, PA-C, Ana Tse, PA-C, Tami Lala, CNP,        Sonam Sadler, CNS, Aditi Nance, CNP, Ana Booker, CNP         [facility]   IN-PATIENT SERVICE   [facility]    Discharge Summary     Patient ID: Maldonado Geiger  :  1960   MRN: [id number]     ACCOUNT:  [id number]   Patient's PCP: Fabi Anders MD  Admit Date: 2025   Discharge Date: 2025 ***    Length of Stay: 1  Code Status:  Full Code  Admitting Physician: Barbi Jundi, MD  Discharge Physician: Shayna Blount MD     Active Discharge Diagnoses:     Hospital Problem Lists:  Principal Problem:    JARED (acute kidney injury)  Active Problems:    Hydronephrosis of left kidney    History of renal cell cancer    History of right nephrectomy    Essential hypertension    Seizure disorder (HCC)    Stage 4 chronic kidney disease (HCC)    BPH (benign prostatic hyperplasia)  Resolved Problems:    * No resolved hospital problems. *      Admission Condition:   Angel